# Patient Record
Sex: FEMALE | Race: WHITE | NOT HISPANIC OR LATINO | Employment: OTHER | URBAN - METROPOLITAN AREA
[De-identification: names, ages, dates, MRNs, and addresses within clinical notes are randomized per-mention and may not be internally consistent; named-entity substitution may affect disease eponyms.]

---

## 2023-01-23 ENCOUNTER — TELEPHONE (OUTPATIENT)
Dept: PRIMARY CARE CLINIC | Facility: CLINIC | Age: 73
End: 2023-01-23
Payer: MEDICARE

## 2023-01-23 DIAGNOSIS — R00.2 PALPITATIONS: Primary | ICD-10-CM
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
## 2023-02-23 ENCOUNTER — HOSPITAL ENCOUNTER (OUTPATIENT)
Dept: CARDIOLOGY | Facility: HOSPITAL | Age: 73
Discharge: HOME | End: 2023-02-23
Attending: INTERNAL MEDICINE
Payer: MEDICARE

## 2023-02-23 ENCOUNTER — OFFICE VISIT (OUTPATIENT)
Dept: CARDIOLOGY | Facility: CLINIC | Age: 73
End: 2023-02-23
Payer: MEDICARE

## 2023-02-23 VITALS
OXYGEN SATURATION: 97 % | SYSTOLIC BLOOD PRESSURE: 155 MMHG | WEIGHT: 124.56 LBS | DIASTOLIC BLOOD PRESSURE: 89 MMHG | BODY MASS INDEX: 20.75 KG/M2 | HEART RATE: 112 BPM | HEIGHT: 65 IN

## 2023-02-23 DIAGNOSIS — F41.9 ANXIETY: ICD-10-CM

## 2023-02-23 DIAGNOSIS — R55 SYNCOPE AND COLLAPSE: Primary | ICD-10-CM

## 2023-02-23 DIAGNOSIS — I49.9 CARDIAC ARRHYTHMIA, UNSPECIFIED CARDIAC ARRHYTHMIA TYPE: ICD-10-CM

## 2023-02-23 DIAGNOSIS — M06.9 RHEUMATOID ARTHRITIS, INVOLVING UNSPECIFIED SITE, UNSPECIFIED WHETHER RHEUMATOID FACTOR PRESENT: ICD-10-CM

## 2023-02-23 DIAGNOSIS — I49.9 CARDIAC ARRHYTHMIA, UNSPECIFIED CARDIAC ARRHYTHMIA TYPE: Primary | ICD-10-CM

## 2023-02-23 DIAGNOSIS — R00.2 PALPITATIONS: ICD-10-CM

## 2023-02-23 PROCEDURE — 3288F FALL RISK ASSESSMENT DOCD: CPT | Mod: CPTII,S$GLB,, | Performed by: INTERNAL MEDICINE

## 2023-02-23 PROCEDURE — 1159F MED LIST DOCD IN RCRD: CPT | Mod: CPTII,S$GLB,, | Performed by: INTERNAL MEDICINE

## 2023-02-23 PROCEDURE — 93010 EKG 12-LEAD: ICD-10-PCS | Mod: ,,, | Performed by: INTERNAL MEDICINE

## 2023-02-23 PROCEDURE — 3079F DIAST BP 80-89 MM HG: CPT | Mod: CPTII,S$GLB,, | Performed by: INTERNAL MEDICINE

## 2023-02-23 PROCEDURE — 3008F PR BODY MASS INDEX (BMI) DOCUMENTED: ICD-10-PCS | Mod: CPTII,S$GLB,, | Performed by: INTERNAL MEDICINE

## 2023-02-23 PROCEDURE — 1159F PR MEDICATION LIST DOCUMENTED IN MEDICAL RECORD: ICD-10-PCS | Mod: CPTII,S$GLB,, | Performed by: INTERNAL MEDICINE

## 2023-02-23 PROCEDURE — 3077F SYST BP >= 140 MM HG: CPT | Mod: CPTII,S$GLB,, | Performed by: INTERNAL MEDICINE

## 2023-02-23 PROCEDURE — 3288F PR FALLS RISK ASSESSMENT DOCUMENTED: ICD-10-PCS | Mod: CPTII,S$GLB,, | Performed by: INTERNAL MEDICINE

## 2023-02-23 PROCEDURE — 99999 PR PBB SHADOW E&M-EST. PATIENT-LVL IV: ICD-10-PCS | Mod: PBBFAC,,, | Performed by: INTERNAL MEDICINE

## 2023-02-23 PROCEDURE — 1101F PR PT FALLS ASSESS DOC 0-1 FALLS W/OUT INJ PAST YR: ICD-10-PCS | Mod: CPTII,S$GLB,, | Performed by: INTERNAL MEDICINE

## 2023-02-23 PROCEDURE — 99204 OFFICE O/P NEW MOD 45 MIN: CPT | Mod: S$GLB,,, | Performed by: INTERNAL MEDICINE

## 2023-02-23 PROCEDURE — 99204 PR OFFICE/OUTPT VISIT, NEW, LEVL IV, 45-59 MIN: ICD-10-PCS | Mod: S$GLB,,, | Performed by: INTERNAL MEDICINE

## 2023-02-23 PROCEDURE — 93010 ELECTROCARDIOGRAM REPORT: CPT | Mod: ,,, | Performed by: INTERNAL MEDICINE

## 2023-02-23 PROCEDURE — 99999 PR PBB SHADOW E&M-EST. PATIENT-LVL IV: CPT | Mod: PBBFAC,,, | Performed by: INTERNAL MEDICINE

## 2023-02-23 PROCEDURE — 93005 ELECTROCARDIOGRAM TRACING: CPT

## 2023-02-23 PROCEDURE — 1126F AMNT PAIN NOTED NONE PRSNT: CPT | Mod: CPTII,S$GLB,, | Performed by: INTERNAL MEDICINE

## 2023-02-23 PROCEDURE — 1101F PT FALLS ASSESS-DOCD LE1/YR: CPT | Mod: CPTII,S$GLB,, | Performed by: INTERNAL MEDICINE

## 2023-02-23 PROCEDURE — 3079F PR MOST RECENT DIASTOLIC BLOOD PRESSURE 80-89 MM HG: ICD-10-PCS | Mod: CPTII,S$GLB,, | Performed by: INTERNAL MEDICINE

## 2023-02-23 PROCEDURE — 3077F PR MOST RECENT SYSTOLIC BLOOD PRESSURE >= 140 MM HG: ICD-10-PCS | Mod: CPTII,S$GLB,, | Performed by: INTERNAL MEDICINE

## 2023-02-23 PROCEDURE — 3008F BODY MASS INDEX DOCD: CPT | Mod: CPTII,S$GLB,, | Performed by: INTERNAL MEDICINE

## 2023-02-23 PROCEDURE — 1126F PR PAIN SEVERITY QUANTIFIED, NO PAIN PRESENT: ICD-10-PCS | Mod: CPTII,S$GLB,, | Performed by: INTERNAL MEDICINE

## 2023-02-23 RX ORDER — TRAMADOL HYDROCHLORIDE 50 MG/1
50 TABLET ORAL 2 TIMES DAILY PRN
Status: ON HOLD | COMMUNITY
Start: 2022-11-21 | End: 2023-06-26

## 2023-02-23 RX ORDER — HYDROXYCHLOROQUINE SULFATE 200 MG/1
1 TABLET, FILM COATED ORAL EVERY MORNING
Status: ON HOLD | COMMUNITY
Start: 2023-02-20 | End: 2023-06-26 | Stop reason: HOSPADM
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
## 2023-03-10 ENCOUNTER — HOSPITAL ENCOUNTER (OUTPATIENT)
Dept: CARDIOLOGY | Facility: HOSPITAL | Age: 73
Discharge: HOME | End: 2023-03-10
Attending: INTERNAL MEDICINE
Payer: MEDICARE

## 2023-03-10 DIAGNOSIS — R55 SYNCOPE AND COLLAPSE: ICD-10-CM

## 2023-03-10 DIAGNOSIS — R00.2 PALPITATIONS: ICD-10-CM

## 2023-03-10 LAB
LEFT ARM DIASTOLIC BLOOD PRESSURE: 95 MMHG
LEFT ARM SYSTOLIC BLOOD PRESSURE: 153 MMHG
LEFT CBA DIAS: 16 CM/S
LEFT CBA SYS: 63 CM/S
LEFT CCA DIST DIAS: 24 CM/S
LEFT CCA DIST SYS: 78 CM/S
LEFT CCA MID DIAS: 13 CM/S
LEFT CCA MID SYS: 98 CM/S
LEFT CCA PROX DIAS: 10 CM/S
LEFT CCA PROX SYS: 97 CM/S
LEFT ECA DIAS: 7 CM/S
LEFT ECA SYS: 253 CM/S
LEFT ICA DIST DIAS: 22 CM/S
LEFT ICA DIST SYS: 69 CM/S
LEFT ICA MID DIAS: 31 CM/S
LEFT ICA MID SYS: 112 CM/S
LEFT ICA PROX DIAS: 30 CM/S
LEFT ICA PROX SYS: 103 CM/S
LEFT VERTEBRAL DIAS: 15 CM/S
LEFT VERTEBRAL SYS: 79 CM/S
OHS CV CAROTID RIGHT ICA EDV HIGHEST: 27
OHS CV CAROTID ULTRASOUND LEFT ICA/CCA RATIO: 1.44
OHS CV CAROTID ULTRASOUND RIGHT ICA/CCA RATIO: 1.67
OHS CV PV CAROTID LEFT HIGHEST CCA: 98
OHS CV PV CAROTID LEFT HIGHEST ICA: 112
OHS CV PV CAROTID RIGHT HIGHEST CCA: 83
OHS CV PV CAROTID RIGHT HIGHEST ICA: 97
OHS CV US CAROTID LEFT HIGHEST EDV: 31
RIGHT ARM DIASTOLIC BLOOD PRESSURE: 81 MMHG
RIGHT ARM SYSTOLIC BLOOD PRESSURE: 153 MMHG
RIGHT CBA DIAS: 14 CM/S
RIGHT CBA SYS: 41 CM/S
RIGHT CCA DIST DIAS: 19 CM/S
RIGHT CCA DIST SYS: 58 CM/S
RIGHT CCA MID DIAS: 10 CM/S
RIGHT CCA MID SYS: 83 CM/S
RIGHT CCA PROX DIAS: 13 CM/S
RIGHT CCA PROX SYS: 51 CM/S
RIGHT ECA DIAS: 10 CM/S
RIGHT ECA SYS: 63 CM/S
RIGHT ICA DIST DIAS: 1 CM/S
RIGHT ICA DIST SYS: 79 CM/S
RIGHT ICA MID DIAS: 27 CM/S
RIGHT ICA MID SYS: 97 CM/S
RIGHT ICA PROX DIAS: 16 CM/S
RIGHT ICA PROX SYS: 67 CM/S
RIGHT VERTEBRAL DIAS: 13 CM/S
RIGHT VERTEBRAL SYS: 72 CM/S

## 2023-03-10 PROCEDURE — 93248 EXT ECG>7D<15D REV&INTERPJ: CPT | Mod: ,,, | Performed by: INTERNAL MEDICINE

## 2023-03-10 PROCEDURE — 93880 EXTRACRANIAL BILAT STUDY: CPT | Mod: 26,,, | Performed by: INTERNAL MEDICINE

## 2023-03-10 PROCEDURE — 93248 CV CARDIAC MONITOR - 3-15 DAY ADULT (CUPID ONLY): ICD-10-PCS | Mod: ,,, | Performed by: INTERNAL MEDICINE

## 2023-03-10 PROCEDURE — 93880 EXTRACRANIAL BILAT STUDY: CPT

## 2023-03-10 PROCEDURE — 93880 CV US DOPPLER CAROTID (CUPID ONLY): ICD-10-PCS | Mod: 26,,, | Performed by: INTERNAL MEDICINE

## 2023-03-16 ENCOUNTER — PATIENT MESSAGE (OUTPATIENT)
Dept: PRIMARY CARE CLINIC | Facility: CLINIC | Age: 73
End: 2023-03-16
Payer: MEDICARE

## 2023-03-22 ENCOUNTER — TELEPHONE (OUTPATIENT)
Dept: CARDIOLOGY | Facility: CLINIC | Age: 73
End: 2023-03-22
Payer: MEDICARE

## 2023-03-22 DIAGNOSIS — I47.10 SVT (SUPRAVENTRICULAR TACHYCARDIA): Primary | ICD-10-CM

## 2023-03-22 NOTE — TELEPHONE ENCOUNTER
Pt feels fine- no different than usual  Please advise    ----- Message from Alexi Adhikari sent at 3/22/2023  2:13 PM CDT -----  Regarding: Brigitte with [employer] [facility]  Pt report is for SVT per Brigitte with [employer] [facility].

## 2023-03-22 NOTE — TELEPHONE ENCOUNTER
[facility] sending over report of tachycardia episode pt experience and pt complaining of being dizzy and lightheaded. Will f/u with pt in regards to symptoms

## 2023-03-23 ENCOUNTER — TELEPHONE (OUTPATIENT)
Dept: CARDIOLOGY | Facility: CLINIC | Age: 73
End: 2023-03-23
Payer: MEDICARE

## 2023-03-23 DIAGNOSIS — I47.10 SVT (SUPRAVENTRICULAR TACHYCARDIA): Primary | ICD-10-CM

## 2023-03-23 RX ORDER — METOPROLOL SUCCINATE 25 MG/1
25 TABLET, EXTENDED RELEASE ORAL DAILY
Qty: 30 TABLET | Refills: 11 | Status: SHIPPED | OUTPATIENT
Start: 2023-03-23 | End: 2023-09-12

## 2023-03-23 NOTE — PROGRESS NOTES
Called again, no option to leave a voicemail, episodes of SVT on the monitor   Start toprol and follow up with EP   If symptoms fail to improve go to the ED

## 2023-03-23 NOTE — TELEPHONE ENCOUNTER
Pt was contacted about results:     Called again, no option to leave a voicemail, episodes of SVT on the monitor    Start toprol and follow up with EP    If symptoms fail to improve go to the ED           Pt verbalized understanding with no questions or concerns.

## 2023-03-23 NOTE — TELEPHONE ENCOUNTER
Patient contacted and was advised that the patient was checked on . The report came in for a SVT event on 03/22/2023 with 170-200 beats per minute.at 21:59 pm the vital was place on 03/10/2023.     The patient stated she feels fine and was aware of the event but has no symptoms.   The patient was advised that we would get in contact with her if another appointment was available.   The patient stated understanding with no questions.

## 2023-03-23 NOTE — TELEPHONE ENCOUNTER
Called patient to notify her of her referral to see Dr. Diaz to follow up with her episode of SVT. Made an appointment to follow up with him. Patient understood and accepted appointment with no questions or concerns.

## 2023-04-04 LAB
OHS CV HOLTER SINUS AVERAGE HR: 96
OHS CV HOLTER SINUS MAX HR: 141
OHS CV HOLTER SINUS MIN HR: 52

## 2023-04-10 ENCOUNTER — TELEPHONE (OUTPATIENT)
Dept: CARDIOLOGY | Facility: CLINIC | Age: 73
End: 2023-04-10
Payer: MEDICARE

## 2023-04-10 NOTE — TELEPHONE ENCOUNTER
Called patient to reschedule her upcoming appointment. Patient rescheduled appointment with no questions or concerns.       ----- Message from Yennifer Kramer MA sent at 4/10/2023  3:38 PM CDT -----  Contact: patient  This pt is returning a call.    Yennifer      ----- Message -----  From: Flor Gallardo  Sent: 4/10/2023   1:50 PM CDT  To: Joe Teran Staff    Type:  Patient Call          Who Called: patient         Does the patient know what this is regarding?: Requesting a call back to have appt rescheduled ; please advise           Would the patient rather a call back or a response via [facility]? Call           Best Call Back Number:[phone]             Additional Information:

## 2023-04-18 ENCOUNTER — TELEPHONE (OUTPATIENT)
Dept: PRIMARY CARE CLINIC | Facility: CLINIC | Age: 73
End: 2023-04-18
Payer: MEDICARE

## 2023-04-18 NOTE — TELEPHONE ENCOUNTER
----- Message from Estella Becker sent at 4/17/2023 12:59 PM CDT -----  Contact: charmaine  Patient is calling to speak with the nurse regarding questions and concerns. Reports her rheumatology dr wants her to get an earlier appointment to get her thyroid checked. Please give the patient a call back at .[phone]   Thanks natali

## 2023-05-03 ENCOUNTER — OFFICE VISIT (OUTPATIENT)
Dept: CARDIOLOGY | Facility: CLINIC | Age: 73
End: 2023-05-03
Payer: MEDICARE

## 2023-05-03 VITALS
OXYGEN SATURATION: 98 % | BODY MASS INDEX: 22.89 KG/M2 | HEART RATE: 57 BPM | WEIGHT: 137.56 LBS | SYSTOLIC BLOOD PRESSURE: 120 MMHG | DIASTOLIC BLOOD PRESSURE: 79 MMHG

## 2023-05-03 DIAGNOSIS — I47.10 SUPRAVENTRICULAR TACHYCARDIA: Primary | ICD-10-CM

## 2023-05-03 DIAGNOSIS — I49.3 PVCS (PREMATURE VENTRICULAR CONTRACTIONS): ICD-10-CM

## 2023-05-03 PROCEDURE — 3288F FALL RISK ASSESSMENT DOCD: CPT | Mod: CPTII,S$GLB,, | Performed by: INTERNAL MEDICINE

## 2023-05-03 PROCEDURE — 99999 PR PBB SHADOW E&M-EST. PATIENT-LVL IV: CPT | Mod: PBBFAC,,, | Performed by: INTERNAL MEDICINE

## 2023-05-03 PROCEDURE — 3074F PR MOST RECENT SYSTOLIC BLOOD PRESSURE < 130 MM HG: ICD-10-PCS | Mod: CPTII,S$GLB,, | Performed by: INTERNAL MEDICINE

## 2023-05-03 PROCEDURE — 99999 PR PBB SHADOW E&M-EST. PATIENT-LVL IV: ICD-10-PCS | Mod: PBBFAC,,, | Performed by: INTERNAL MEDICINE

## 2023-05-03 PROCEDURE — 1101F PT FALLS ASSESS-DOCD LE1/YR: CPT | Mod: CPTII,S$GLB,, | Performed by: INTERNAL MEDICINE

## 2023-05-03 PROCEDURE — 1126F PR PAIN SEVERITY QUANTIFIED, NO PAIN PRESENT: ICD-10-PCS | Mod: CPTII,S$GLB,, | Performed by: INTERNAL MEDICINE

## 2023-05-03 PROCEDURE — 99205 OFFICE O/P NEW HI 60 MIN: CPT | Mod: S$GLB,,, | Performed by: INTERNAL MEDICINE

## 2023-05-03 PROCEDURE — 1159F MED LIST DOCD IN RCRD: CPT | Mod: CPTII,S$GLB,, | Performed by: INTERNAL MEDICINE

## 2023-05-03 PROCEDURE — 3288F PR FALLS RISK ASSESSMENT DOCUMENTED: ICD-10-PCS | Mod: CPTII,S$GLB,, | Performed by: INTERNAL MEDICINE

## 2023-05-03 PROCEDURE — 1159F PR MEDICATION LIST DOCUMENTED IN MEDICAL RECORD: ICD-10-PCS | Mod: CPTII,S$GLB,, | Performed by: INTERNAL MEDICINE

## 2023-05-03 PROCEDURE — 1101F PR PT FALLS ASSESS DOC 0-1 FALLS W/OUT INJ PAST YR: ICD-10-PCS | Mod: CPTII,S$GLB,, | Performed by: INTERNAL MEDICINE

## 2023-05-03 PROCEDURE — 3078F DIAST BP <80 MM HG: CPT | Mod: CPTII,S$GLB,, | Performed by: INTERNAL MEDICINE

## 2023-05-03 PROCEDURE — 3008F BODY MASS INDEX DOCD: CPT | Mod: CPTII,S$GLB,, | Performed by: INTERNAL MEDICINE

## 2023-05-03 PROCEDURE — 3074F SYST BP LT 130 MM HG: CPT | Mod: CPTII,S$GLB,, | Performed by: INTERNAL MEDICINE

## 2023-05-03 PROCEDURE — 99205 PR OFFICE/OUTPT VISIT, NEW, LEVL V, 60-74 MIN: ICD-10-PCS | Mod: S$GLB,,, | Performed by: INTERNAL MEDICINE

## 2023-05-03 PROCEDURE — 3078F PR MOST RECENT DIASTOLIC BLOOD PRESSURE < 80 MM HG: ICD-10-PCS | Mod: CPTII,S$GLB,, | Performed by: INTERNAL MEDICINE

## 2023-05-03 PROCEDURE — 3008F PR BODY MASS INDEX (BMI) DOCUMENTED: ICD-10-PCS | Mod: CPTII,S$GLB,, | Performed by: INTERNAL MEDICINE

## 2023-05-03 PROCEDURE — 1126F AMNT PAIN NOTED NONE PRSNT: CPT | Mod: CPTII,S$GLB,, | Performed by: INTERNAL MEDICINE

## 2023-05-03 RX ORDER — GABAPENTIN 300 MG/1
300 CAPSULE ORAL DAILY
COMMUNITY
End: 2024-02-06

## 2023-05-03 RX ORDER — PREDNISONE 5 MG/1
5 TABLET ORAL DAILY
COMMUNITY
End: 2023-07-03

## 2023-05-03 RX ORDER — FOLIC ACID 1 MG/1
1 TABLET ORAL DAILY
Status: ON HOLD | COMMUNITY
End: 2024-01-26

## 2023-05-03 RX ORDER — METHOTREXATE 2.5 MG/1
TABLET ORAL
Status: ON HOLD | COMMUNITY
End: 2024-01-26 | Stop reason: ALTCHOICE

## 2023-05-03 NOTE — PROGRESS NOTES
Subjective:   Patient ID:  Sheryl Carter is a 72 y.o. female who presents for evaluation of SVT      72 yoF here for SVT management. She had an episode of syncope which resulted in a cardiac work up. Event monitor revealed sustained short RP tachycardia 175-200 bpm lasting over 2h. She also had very high PVC burden 28%. She feels light headed with the SVT events and subsequently feels drained after the events are over.     Stress echo 8/21:  Stress Echo: All segments became appropriately hypercontractile with   physiologic stress. LVEF 65%. No ischemic changes visualized.        Event monitor 4/23:         Study duration: 10 days         Baseline rhythm is NSR            Average diurnal HR is @ 100            Average nocturnal HR is @ 90         SDNN is 96 - c/w moderate vagal tone for age         AF was not detected          There were multiple episodes of what appeared to be AV neymar reentry tachycardia with rates varying between a low of 175 and a high of 220 beats per minute.  The longest such episode lasted 2 hours and 22 minutes.  At least 2 episodes were symptomatic as the patient enter the event correlating with these.         No episodes of sustained VT were noted but the PVC burden was 28%.         Two symptomatic events submitted.  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    Past Medical History:  No date: Arthritis  No date: HTN (hypertension)  No date: Insulin resistance    Past Surgical History:  No date: LEFT HIP REPLACEMENT  No date: TOTAL ABDOMINAL HYSTERECTOMY W/ BILATERAL SALPINGOOPHORECTOMY    Social History    Socioeconomic History      Marital status:     Tobacco Use      Smoking status: Former      Smokeless tobacco: Never    Substance and Sexual Activity      Alcohol use: Yes      Drug use: Never      Sexual activity: Yes        Birth control/protection: Post-menopausal    Review of patient's family history indicates:    Current Outpatient Medications:  buPROPion (WELLBUTRIN SR) 150  MG TBSR 12 hr tablet, Wellbutrin SR Take No date recorded No form recorded No frequency recorded No route recorded No set duration recorded No set duration amount recorded active No dosage strength recorded No dosage strength units of measure recorded, Disp: , Rfl:   estradioL (IMVEXXY MAINTENANCE PACK) 10 mcg Inst, Place 1 applicator vaginally twice a week., Disp: 8 each, Rfl: 11  fluticasone propionate (FLONASE) 50 mcg/actuation nasal spray, fluticasone propionate Take 1-2 spray(s) (nasal) 2 times per day PRN for 7 days [id number] spray,suspension 2 times per day nasal 7 days suspended 50 mcg/actuation, Disp: , Rfl:   hydrocortisone-pramoxine (ANALPRAM-HC) 2.5-1 % Crea, , Disp: , Rfl:   hydrOXYchloroQUINE (PLAQUENIL) 200 mg tablet, Take 1 tablet by mouth every morning., Disp: , Rfl:   metFORMIN (GLUCOPHAGE) 500 MG tablet, metformin Take No date recorded No form recorded No frequency recorded No route recorded No set duration recorded No set duration amount recorded active No dosage strength recorded No dosage strength units of measure recorded, Disp: , Rfl:   metoprolol succinate (TOPROL-XL) 25 MG 24 hr tablet, Take 1 tablet (25 mg total) by mouth once daily., Disp: 30 tablet, Rfl: 11  pantoprazole (PROTONIX) 40 MG tablet, Take 40 mg by mouth., Disp: , Rfl:   thyroid, pork, (ARMOUR THYROID) 60 mg Tab, Take 60 mg by mouth., Disp: , Rfl:   traMADoL (ULTRAM) 50 mg tablet, Take 50 mg by mouth 2 (two) times daily as needed., Disp: , Rfl:   vitamin D (VITAMIN D3) 1000 units Tab, Take 1,000 Units by mouth once daily., Disp: , Rfl:   XIIDRA 5 % Dpet, , Disp: , Rfl:     No current facility-administered medications for this visit.      Review of Systems   Constitutional: Negative for fever and malaise/fatigue.   HENT:  Negative for sore throat.    Eyes:  Negative for blurred vision.   Cardiovascular:  Positive for chest pain. Negative for claudication, cyanosis, dyspnea on exertion, irregular heartbeat, leg swelling,  near-syncope, orthopnea, palpitations, paroxysmal nocturnal dyspnea and syncope.   Respiratory:  Negative for cough and hemoptysis.    Hematologic/Lymphatic: Negative for bleeding problem.   Skin:  Negative for rash.   Musculoskeletal:  Negative for falls.   Gastrointestinal:  Negative for abdominal pain.   Genitourinary: Negative.    Neurological: Negative.    Psychiatric/Behavioral:  Negative for altered mental status and substance abuse.      Objective:   Physical Exam  Vitals reviewed.   Constitutional:       General: She is not in acute distress.     Appearance: She is well-developed.   HENT:      Head: Normocephalic and atraumatic.   Eyes:      Pupils: Pupils are equal, round, and reactive to light.   Neck:      Thyroid: No thyromegaly.      Vascular: No JVD.   Cardiovascular:      Rate and Rhythm: Normal rate and regular rhythm.      Chest Wall: PMI is not displaced.      Heart sounds: Normal heart sounds, S1 normal and S2 normal. No murmur heard.    No friction rub. No gallop.   Pulmonary:      Effort: Pulmonary effort is normal. No respiratory distress.      Breath sounds: Normal breath sounds. No wheezing or rales.   Abdominal:      General: Bowel sounds are normal. There is no distension.      Palpations: Abdomen is soft.      Tenderness: There is no abdominal tenderness. There is no guarding or rebound.   Musculoskeletal:         General: No tenderness. Normal range of motion.      Cervical back: Normal range of motion.   Skin:     General: Skin is warm and dry.      Findings: No erythema or rash.   Neurological:      Mental Status: She is alert and oriented to person, place, and time.      Cranial Nerves: No cranial nerve deficit.   Psychiatric:         Behavior: Behavior normal.         Thought Content: Thought content normal.         Judgment: Judgment normal.       Assessment:      1. Supraventricular tachycardia    2. PVCs (premature ventricular contractions)        Plan:     72 yoF here for SVT  management. She has documented, symptomatic, short RP tachycardia over 200 bpm. I recommended EPS/RFA for definitive therapy. She also has outflow tract PVCs at 28% burden. I offered concomitant PVC ablation. Extensive discussion regarding risks, benefits, and alternative approaches to the procedure was had with the patient.  The patient voices understanding and all questions have been answered.  The patient would like to proceed as planned.    EPS/RFA for SVT  Prepare to map LVOT and RVOT for PVCs  [facility]

## 2023-05-09 ENCOUNTER — TELEPHONE (OUTPATIENT)
Dept: ELECTROPHYSIOLOGY | Facility: CLINIC | Age: 73
End: 2023-05-09
Payer: MEDICARE

## 2023-05-09 DIAGNOSIS — I49.3 PVCS (PREMATURE VENTRICULAR CONTRACTIONS): ICD-10-CM

## 2023-05-09 DIAGNOSIS — I49.9 CARDIAC ARRHYTHMIA, UNSPECIFIED CARDIAC ARRHYTHMIA TYPE: ICD-10-CM

## 2023-05-09 DIAGNOSIS — I47.10 SUPRAVENTRICULAR TACHYCARDIA: Primary | ICD-10-CM

## 2023-05-09 NOTE — TELEPHONE ENCOUNTER
Spoke with Ms. Carter and scheduled her procedure for 6/26/23. Procedure details reviewed and instructions will be sent via mail as requested.    
General: NAD, good hygiene, well developed  HENT: Atraumatic, normal fontanella, EOMI, no conjunctivae injection, moist mucosa, no post. oropharynx erythema, exudates, TM intact no effusion.   Neck: normal ROM and trachea midline   Cardiovascular: tachy, S1&2, no M or R  Respiratory: CTABL, no wheezes or crackles, no decreased breath sounds  Abdominal: soft and non-tender non distended, neg for guarding, no CVA tenderness   Extremities: no edema of the legs/feet  Skin: warm, well perfused, cap refill< 3 sec  Neurologic: nonfocal

## 2023-05-15 ENCOUNTER — TELEPHONE (OUTPATIENT)
Dept: PRIMARY CARE CLINIC | Facility: CLINIC | Age: 73
End: 2023-05-15
Payer: MEDICARE

## 2023-05-15 NOTE — TELEPHONE ENCOUNTER
----- Message from Neha Louis sent at 5/15/2023  2:38 PM CDT -----    Patient Returning Call        Who Called:pt  Does the patient know what this is regarding?:need nurse or doctor to call asap  medications are not working together causing stomach problems  Would the patient rather a call back or a response via [facility]? call  Best Call Back Number:[phone]  Additional Information: call back

## 2023-05-19 ENCOUNTER — PATIENT MESSAGE (OUTPATIENT)
Dept: PRIMARY CARE CLINIC | Facility: CLINIC | Age: 73
End: 2023-05-19
Payer: MEDICARE

## 2023-06-19 ENCOUNTER — LAB VISIT (OUTPATIENT)
Dept: LAB | Facility: HOSPITAL | Age: 73
End: 2023-06-19
Attending: INTERNAL MEDICINE
Payer: MEDICARE

## 2023-06-19 DIAGNOSIS — I49.9 CARDIAC ARRHYTHMIA, UNSPECIFIED CARDIAC ARRHYTHMIA TYPE: ICD-10-CM

## 2023-06-19 DIAGNOSIS — I49.3 PVCS (PREMATURE VENTRICULAR CONTRACTIONS): ICD-10-CM

## 2023-06-19 DIAGNOSIS — I47.10 SUPRAVENTRICULAR TACHYCARDIA: ICD-10-CM

## 2023-06-19 LAB
ANION GAP SERPL CALC-SCNC: 10 MMOL/L (ref 8–16)
APTT PPP: 26.1 SEC (ref 21–32)
BUN SERPL-MCNC: 12 MG/DL (ref 8–23)
CALCIUM SERPL-MCNC: 10.2 MG/DL (ref 8.7–10.5)
CHLORIDE SERPL-SCNC: 107 MMOL/L (ref 95–110)
CO2 SERPL-SCNC: 24 MMOL/L (ref 23–29)
CREAT SERPL-MCNC: 1 MG/DL (ref 0.5–1.4)
ERYTHROCYTE [DISTWIDTH] IN BLOOD BY AUTOMATED COUNT: 20.4 % (ref 11.5–14.5)
EST. GFR  (NO RACE VARIABLE): >60 ML/MIN/1.73 M^2
GLUCOSE SERPL-MCNC: 83 MG/DL (ref 70–110)
HCT VFR BLD AUTO: 41.3 % (ref 37–48.5)
HGB BLD-MCNC: 12.7 G/DL (ref 12–16)
INR PPP: 1 (ref 0.8–1.2)
MCH RBC QN AUTO: 26.8 PG (ref 27–31)
MCHC RBC AUTO-ENTMCNC: 30.8 G/DL (ref 32–36)
MCV RBC AUTO: 87 FL (ref 82–98)
PLATELET # BLD AUTO: 418 K/UL (ref 150–450)
PMV BLD AUTO: 10.6 FL (ref 9.2–12.9)
POTASSIUM SERPL-SCNC: 5 MMOL/L (ref 3.5–5.1)
PROTHROMBIN TIME: 10.5 SEC (ref 9–12.5)
RBC # BLD AUTO: 4.73 M/UL (ref 4–5.4)
SODIUM SERPL-SCNC: 141 MMOL/L (ref 136–145)
WBC # BLD AUTO: 6.69 K/UL (ref 3.9–12.7)

## 2023-06-19 PROCEDURE — 85610 PROTHROMBIN TIME: CPT | Performed by: INTERNAL MEDICINE

## 2023-06-19 PROCEDURE — 80048 BASIC METABOLIC PNL TOTAL CA: CPT | Performed by: INTERNAL MEDICINE

## 2023-06-19 PROCEDURE — 85730 THROMBOPLASTIN TIME PARTIAL: CPT | Performed by: INTERNAL MEDICINE

## 2023-06-19 PROCEDURE — 36415 COLL VENOUS BLD VENIPUNCTURE: CPT | Performed by: INTERNAL MEDICINE

## 2023-06-19 PROCEDURE — 85027 COMPLETE CBC AUTOMATED: CPT | Performed by: INTERNAL MEDICINE

## 2023-06-20 ENCOUNTER — TELEPHONE (OUTPATIENT)
Dept: ELECTROPHYSIOLOGY | Facility: CLINIC | Age: 73
End: 2023-06-20
Payer: MEDICARE

## 2023-06-20 NOTE — TELEPHONE ENCOUNTER
----- Message from Yennifer Kramer MA sent at 6/19/2023  3:46 PM CDT -----  Regarding: FW: please call    ----- Message -----  From: Elda Yoo  Sent: 6/19/2023   3:00 PM CDT  To: Joe Teran Staff  Subject: please call                                      The pt is calling about her procedure Please call her back @ [phone]. Thanks, Elda

## 2023-06-20 NOTE — TELEPHONE ENCOUNTER
I spoke with Ms. Carter and she let me know that she has a skin tear on her calf that was bothering her and she went to urgent care and they put her on abx for 7 days. Her last dose will be on the day of her procedure, 6/26. I let her know that I would discuss this with Dr. Diaz and get back with her. She verbalized understanding and appreciated the call.

## 2023-06-20 NOTE — TELEPHONE ENCOUNTER
----- Message from Yennifer Kramer MA sent at 6/20/2023 11:19 AM CDT -----  Regarding: FW: please call    ----- Message -----  From: Elda Yoo  Sent: 6/20/2023  10:38 AM CDT  To: Joe Teran Staff  Subject: please call                                      The pt is calling to speak with Tata. Please call her back @ [phone]. Thanks, Elda

## 2023-06-20 NOTE — TELEPHONE ENCOUNTER
I attempted to contact Ms. Gibbson. No answer. Unable to leave a message as patient does not have voicemail.

## 2023-06-22 ENCOUNTER — TELEPHONE (OUTPATIENT)
Dept: ELECTROPHYSIOLOGY | Facility: CLINIC | Age: 73
End: 2023-06-22
Payer: MEDICARE

## 2023-06-22 NOTE — TELEPHONE ENCOUNTER
I spoke with Ms. Carter and let her know that we are good to go for her procedure on Monday. I also reviewed and confirmed procedure details with her. She appreciated the call.

## 2023-06-22 NOTE — TELEPHONE ENCOUNTER
----- Message from Jeffry Diaz MD sent at 6/20/2023  5:38 PM CDT -----  Regarding: RE: please call  Good to go thanks  ----- Message -----  From: Tata Roman RN  Sent: 6/20/2023   2:14 PM CDT  To: Jeffry Diaz MD  Subject: FW: please call                                  I spoke with her today and she let me know that she has a skin tear on her calf that was bothering her and she went to urgent care and they put her on abx for 7 days. Her last dose will be on the day of her procedure, 6/26. She is scheduled for a PVC/SVT RFA. Do we need to make any changes or ok to proceed as scheduled?    Thanks,   Tata  ----- Message -----  From: Yennifer Kramer MA  Sent: 6/20/2023  11:19 AM CDT  To: Tata Roman RN  Subject: FW: please call                                    ----- Message -----  From: Elda Yoo  Sent: 6/20/2023  10:38 AM CDT  To: Joe Teran Staff  Subject: please call                                      The pt is calling to speak with Tata. Please call her back @ [phone]. Thanks, Elda

## 2023-06-25 DIAGNOSIS — N63.11 MASS OF UPPER OUTER QUADRANT OF RIGHT BREAST: Primary | ICD-10-CM

## 2023-06-25 PROBLEM — N60.11 DIFFUSE CYSTIC MASTOPATHY OF RIGHT BREAST: Status: ACTIVE | Noted: 2023-06-25

## 2023-06-25 PROBLEM — N63.20 MASS OF MULTIPLE SITES OF LEFT BREAST: Status: ACTIVE | Noted: 2023-06-25

## 2023-06-25 PROBLEM — N60.12 DIFFUSE CYSTIC MASTOPATHY OF LEFT BREAST: Status: ACTIVE | Noted: 2023-06-25

## 2023-06-25 NOTE — PROGRESS NOTES
[facility]  MD Bety Lewis, NP-C    Chief Complaint:   Sheryl Carter is a 72 y.o. female presenting today for  annual follow up. She is due for Mammogram  She reports no interval changes on her self-breast examination.     History of Present Illness:   Mrs. Carter first presented on September 4, 2019 to establish ongoing breast care. She has a history of bilateral breast biopsies. The most recent left breast core biopsies were in June 2019 and were benign showing a benign fibroadenoma and papillomatosis. In December 2019 she was noted with a suspicious right breast mass and snonocore biopsy revealed a hyalinized fibroadenoma, occasional calcifications with NEM. MD:::Ashley Ayon MD.    Past Medical History:   Diagnosis Date    Arthritis     Diffuse cystic mastopathy of left breast 06/25/2023    Diffuse cystic mastopathy of right breast 06/25/2023    HTN (hypertension)     Insulin resistance     Mass of multiple sites of left breast 06/25/2023    Mass of upper outer quadrant of right breast 06/25/2023    SVT (supraventricular tachycardia)       Past Surgical History:   Procedure Laterality Date    ABLATION N/A 6/26/2023    Procedure: Ablation;  Surgeon: Jeffry Diaz MD;  Location: [facility];  Service: Cardiology;  Laterality: N/A;  PVC/SVT, RFA, ACOSTA, anes, MB, 3 Prep    ABLATION, SVT, SLOW PATHWAY MODIFICATION FOR AVNRT  6/26/2023    Procedure: Ablation, SVT, Slow Pathway Modification For AVNRT;  Surgeon: Jeffry Diaz MD;  Location: [facility];  Service: Cardiology;;    LEFT HIP REPLACEMENT      TOTAL ABDOMINAL HYSTERECTOMY W/ BILATERAL SALPINGOOPHORECTOMY          Current Outpatient Medications:     aspirin (ECOTRIN) 81 MG EC tablet, Take 1 tablet (81 mg total) by mouth once daily., Disp: 30 tablet, Rfl: 11    buPROPion (WELLBUTRIN SR) 100 MG TBSR 12 hr tablet, Wellbutrin SR Take (oral) 2 times per day No date recorded tablet  sustained-release 12 hr 2 times per day oral No set duration recorded No set duration amount recorded active 100 mg, Disp: , Rfl:     estradioL (IMVEXXY MAINTENANCE PACK) 10 mcg Inst, Place 1 applicator vaginally twice a week., Disp: 8 each, Rfl: 11    ESTRADIOL ACETATE VAGL, estradiol acetate Take No date recorded No form recorded No frequency recorded No route recorded No set duration recorded No set duration amount recorded active No dosage strength recorded No dosage strength units of measure recorded, Disp: , Rfl:     famotidine (PEPCID) 20 MG tablet, famotidine Take 1 Tablet (oral) 2 times per day for 7 days 20220912 tablet 2 times per day oral 7 days suspended 20 mg, Disp: , Rfl:     fluticasone propionate (FLONASE) 50 mcg/actuation nasal spray, fluticasone propionate Take 1-2 spray(s) (nasal) 2 times per day PRN for 7 days [id number] spray,suspension 2 times per day nasal 7 days suspended 50 mcg/actuation, Disp: , Rfl:     folic acid (FOLVITE) 1 MG tablet, Take 1 mg by mouth once daily., Disp: , Rfl:     gabapentin (NEURONTIN) 300 MG capsule, Take 300 mg by mouth once daily., Disp: , Rfl:     hydrocortisone-pramoxine (ANALPRAM-HC) 2.5-1 % Crea, , Disp: , Rfl:     metFORMIN (GLUCOPHAGE) 500 MG tablet, metformin Take No date recorded No form recorded No frequency recorded No route recorded No set duration recorded No set duration amount recorded active No dosage strength recorded No dosage strength units of measure recorded, Disp: , Rfl:     methotrexate 2.5 MG Tab, Take by mouth every 7 days., Disp: , Rfl:     metoprolol succinate (TOPROL-XL) 25 MG 24 hr tablet, Take 1 tablet (25 mg total) by mouth once daily., Disp: 30 tablet, Rfl: 11    pantoprazole (PROTONIX) 40 MG tablet, Take 40 mg by mouth., Disp: , Rfl:     thyroid, pork, (ARMOUR THYROID) 60 mg Tab, Take 60 mg by mouth., Disp: , Rfl:     vitamin D (VITAMIN D3) 1000 units Tab, Take 1,000 Units by mouth once daily., Disp: , Rfl:     vitamin D (VITAMIN D3)  1000 units Tab, Take 1 tablet by mouth every morning., Disp: , Rfl:     XIIDRA 5 % Dpet, , Disp: , Rfl:    Review of patient's allergies indicates:   Allergen Reactions    Codeine Nausea Only     Other reaction(s): Hallucinations, anxious    Diphenhydramine hcl Rash     Other reaction(s): Rash, Rash, makes me anxious    Meperidine Nausea Only     Other reaction(s): Vomiting    Codeine phosphate Hallucinations      Social History     Tobacco Use    Smoking status: Former     Types: Cigarettes     Quit date:      Years since quittin.5    Smokeless tobacco: Never   Substance Use Topics    Alcohol use: Not Currently      Family History   Problem Relation Age of Onset    Hypertension Father     Diabetes Paternal Grandmother     Heart disease Paternal Grandmother     Breast cancer Paternal Aunt 73    Ovarian cancer Neg Hx         Review of Systems   Integumentary:  Negative for color change, rash, mole/lesion, breast mass, breast discharge and breast tenderness.   Breast: Negative for mass and tenderness     Physical Exam   HENT:   Head: Normocephalic.   Pulmonary/Chest: Right breast exhibits no inverted nipple, no mass, no nipple discharge, no skin change and no tenderness. Left breast exhibits no inverted nipple, no mass, no nipple discharge, no skin change and no tenderness. No breast swelling.   Genitourinary: No breast swelling.   Musculoskeletal: Lymphadenopathy:      Upper Body:      Right upper body: No supraclavicular or axillary adenopathy.      Left upper body: No supraclavicular or axillary adenopathy.     Neurological: She is alert.      MAMMOGRAM REPORT: The patient has had bilateral breast core biopsies. The breast tissue is heterogeneously dense, which lowers the sensitivity of mammography. There are no suspicious masses or suspicious calcifications seen to suggest malignancy. Scattered, circumscribed, benign-appearing masses are demonstrated. Benign-type calcifications are identified. IMPRESSION:  No evidence of malignancy. No significant change when compared to previous exam. Follow-up mammography is recommended in one year.      NOTE:::We viewed her films together at today's visit.  We discussed the multiple views obtained and the important findings.  Even benign changes were mentioned and her questions were answered.  She knows that she may receive a formal letter or report from the Radiologist.  She is to contact us if she has questions.      Assessment/Plan  1. Mass of upper outer quadrant of right breast  Assessment & Plan:  We reviewed our findings today and her questions were answered.  She understands that her imaging and exams have remained stable (and show nothing concerning).  She is comfortable being followed in a conservative fashion.      She understands the importance of monthly self-breast examination and knows to report any and all changes as they occur.        2. Mass of multiple sites of left breast  Assessment & Plan:  Same as above      3. Diffuse cystic mastopathy of right breast  Assessment & Plan:  We discussed our Fibrocystic Mastopathy Protocol in detail. She should take Vitamin E 800 IU everyday x 3 months or until non-tender then can stop Vitamin E vs. continue daily at 400 IU.  The use of ice packs or warms soaks to tender area of the breast may also be of some benefit.  If warm soaks help her tenderness - She can use Aspercreme (unless allergic to Aspirin) on the affected area.  Ibuprofen (if no contraindications) at 800 mg three times per day for 5 days can also relieve many symptoms associated with swollen or inflamed tissue.  She can repeat Ibuprofen for 5 days, but then should be off for 5 days as it may cause gastric upset.  It is a good idea to wear a tight bra during the day and night to minimize movement of the tender area (Sports Bras work well).  Evening Primrose Oil can be bought over the counter and used at a dose of 3000 mg per day to help with any breast  pain/tenderness not improved by implementing the above measures.        4. Diffuse cystic mastopathy of left breast  Assessment & Plan:  Same as above      5. Mammographic microcalcification  Assessment & Plan:  Her imaging and exams have remained stable      6. Family history of malignant neoplasm of breast  Assessment & Plan:  We discussed her family history and how it could impact her own future risks.  We discussed family vs. genetic history and the importance and implications of each. All questions answered to her satisfaction.  She knows that as additional family members are diagnosed - she will need to let us know as this may change follow up and imaging recommendations.               Medical Decision Making:  It is my impression that this patient suffers all conditions contained in this medical document.  Each of these conditions did affect our plan of care and my medical decision making today.  It is my opinion that the medical decision making concerning this patient was of moderate difficulty based on the aforementioned conditions.  Any further recommendations will be communicated to the patient by me.  I have reviewed and verified her allergies, list of medications, medical and surgical histories, social history, and a pertinent review of symptoms.      Follow up:  1 year and prn    For:  ALBINO GARCIA) at

## 2023-06-26 ENCOUNTER — HOSPITAL ENCOUNTER (OUTPATIENT)
Facility: HOSPITAL | Age: 73
Discharge: HOME | End: 2023-06-26
Attending: INTERNAL MEDICINE | Admitting: INTERNAL MEDICINE
Payer: MEDICARE

## 2023-06-26 ENCOUNTER — ANESTHESIA (OUTPATIENT)
Dept: MEDSURG UNIT | Facility: HOSPITAL | Age: 73
End: 2023-06-26
Payer: MEDICARE

## 2023-06-26 ENCOUNTER — ANESTHESIA EVENT (OUTPATIENT)
Dept: MEDSURG UNIT | Facility: HOSPITAL | Age: 73
End: 2023-06-26
Payer: MEDICARE

## 2023-06-26 VITALS
TEMPERATURE: 98 F | WEIGHT: 144 LBS | SYSTOLIC BLOOD PRESSURE: 153 MMHG | RESPIRATION RATE: 18 BRPM | BODY MASS INDEX: 23.99 KG/M2 | OXYGEN SATURATION: 96 % | HEIGHT: 65 IN | DIASTOLIC BLOOD PRESSURE: 88 MMHG | HEART RATE: 80 BPM

## 2023-06-26 DIAGNOSIS — I49.3 PVCS (PREMATURE VENTRICULAR CONTRACTIONS): Primary | ICD-10-CM

## 2023-06-26 DIAGNOSIS — I49.3 PREMATURE VENTRICULAR CONTRACTIONS (PVCS) (VPCS): ICD-10-CM

## 2023-06-26 DIAGNOSIS — I49.9 ARRHYTHMIA: ICD-10-CM

## 2023-06-26 DIAGNOSIS — I47.10 SUPRAVENTRICULAR TACHYCARDIA: ICD-10-CM

## 2023-06-26 PROCEDURE — 93010 EKG 12-LEAD: ICD-10-PCS | Mod: ,,, | Performed by: INTERNAL MEDICINE

## 2023-06-26 PROCEDURE — 36620 INSERTION CATHETER ARTERY: CPT | Mod: 59,,, | Performed by: ANESTHESIOLOGY

## 2023-06-26 PROCEDURE — 25500020 PHARM REV CODE 255: Performed by: INTERNAL MEDICINE

## 2023-06-26 PROCEDURE — 93653 COMPRE EP EVAL TX SVT: CPT | Mod: ,,, | Performed by: INTERNAL MEDICINE

## 2023-06-26 PROCEDURE — 27201423 OPTIME MED/SURG SUP & DEVICES STERILE SUPPLY: Performed by: INTERNAL MEDICINE

## 2023-06-26 PROCEDURE — 93655 ICAR CATH ABLTJ DSCRT ARRHYT: CPT | Performed by: INTERNAL MEDICINE

## 2023-06-26 PROCEDURE — C1766 INTRO/SHEATH,STRBLE,NON-PEEL: HCPCS | Performed by: INTERNAL MEDICINE

## 2023-06-26 PROCEDURE — 63600175 PHARM REV CODE 636 W HCPCS: Performed by: INTERNAL MEDICINE

## 2023-06-26 PROCEDURE — 37000008 HC ANESTHESIA 1ST 15 MINUTES: Performed by: INTERNAL MEDICINE

## 2023-06-26 PROCEDURE — D9220A PRA ANESTHESIA: Mod: ANES,,, | Performed by: ANESTHESIOLOGY

## 2023-06-26 PROCEDURE — 25000003 PHARM REV CODE 250: Performed by: ANESTHESIOLOGY

## 2023-06-26 PROCEDURE — 25000003 PHARM REV CODE 250: Performed by: NURSE ANESTHETIST, CERTIFIED REGISTERED

## 2023-06-26 PROCEDURE — C1769 GUIDE WIRE: HCPCS | Performed by: INTERNAL MEDICINE

## 2023-06-26 PROCEDURE — C1730 CATH, EP, 19 OR FEW ELECT: HCPCS | Performed by: INTERNAL MEDICINE

## 2023-06-26 PROCEDURE — 63600175 PHARM REV CODE 636 W HCPCS: Performed by: ANESTHESIOLOGY

## 2023-06-26 PROCEDURE — 93005 ELECTROCARDIOGRAM TRACING: CPT | Mod: 59

## 2023-06-26 PROCEDURE — 25000003 PHARM REV CODE 250: Performed by: STUDENT IN AN ORGANIZED HEALTH CARE EDUCATION/TRAINING PROGRAM

## 2023-06-26 PROCEDURE — 93010 ELECTROCARDIOGRAM REPORT: CPT | Mod: ,,, | Performed by: INTERNAL MEDICINE

## 2023-06-26 PROCEDURE — 63600175 PHARM REV CODE 636 W HCPCS: Performed by: NURSE ANESTHETIST, CERTIFIED REGISTERED

## 2023-06-26 PROCEDURE — C1894 INTRO/SHEATH, NON-LASER: HCPCS | Performed by: INTERNAL MEDICINE

## 2023-06-26 PROCEDURE — 37000009 HC ANESTHESIA EA ADD 15 MINS: Performed by: INTERNAL MEDICINE

## 2023-06-26 PROCEDURE — D9220A PRA ANESTHESIA: ICD-10-PCS | Mod: ANES,,, | Performed by: ANESTHESIOLOGY

## 2023-06-26 PROCEDURE — D9220A PRA ANESTHESIA: Mod: CRNA,,, | Performed by: NURSE ANESTHETIST, CERTIFIED REGISTERED

## 2023-06-26 PROCEDURE — C1760 CLOSURE DEV, VASC: HCPCS | Performed by: INTERNAL MEDICINE

## 2023-06-26 PROCEDURE — 25000003 PHARM REV CODE 250: Performed by: INTERNAL MEDICINE

## 2023-06-26 PROCEDURE — 93653 PR ELECTROPHYS EVAL, COMPREHEN, W/SUPRAVENT TACHYCARD TRMT: ICD-10-PCS | Mod: ,,, | Performed by: INTERNAL MEDICINE

## 2023-06-26 PROCEDURE — C2630 CATH, EP, COOL-TIP: HCPCS | Performed by: INTERNAL MEDICINE

## 2023-06-26 PROCEDURE — 27201037 HC PRESSURE MONITORING SET UP

## 2023-06-26 PROCEDURE — 93655 ICAR CATH ABLTJ DSCRT ARRHYT: CPT | Mod: ,,, | Performed by: INTERNAL MEDICINE

## 2023-06-26 PROCEDURE — 93653 COMPRE EP EVAL TX SVT: CPT | Performed by: INTERNAL MEDICINE

## 2023-06-26 PROCEDURE — 93655 PR ABLATE ARRHYTHMIA ADD ON: ICD-10-PCS | Mod: ,,, | Performed by: INTERNAL MEDICINE

## 2023-06-26 PROCEDURE — 36620 ARTERIAL: ICD-10-PCS | Mod: 59,,, | Performed by: ANESTHESIOLOGY

## 2023-06-26 PROCEDURE — D9220A PRA ANESTHESIA: ICD-10-PCS | Mod: CRNA,,, | Performed by: NURSE ANESTHETIST, CERTIFIED REGISTERED

## 2023-06-26 PROCEDURE — C1887 CATHETER, GUIDING: HCPCS | Performed by: INTERNAL MEDICINE

## 2023-06-26 RX ORDER — FLUORESCEIN SODIUM AND BENOXINATE HYDROCHLORIDE 2.6; 4.4 MG/ML; MG/ML
2 SOLUTION/ DROPS OPHTHALMIC ONCE
Status: COMPLETED | OUTPATIENT
Start: 2023-06-26 | End: 2023-06-26

## 2023-06-26 RX ORDER — IODIXANOL 320 MG/ML
INJECTION, SOLUTION INTRAVASCULAR
Status: DISCONTINUED | OUTPATIENT
Start: 2023-06-26 | End: 2023-06-26 | Stop reason: HOSPADM

## 2023-06-26 RX ORDER — HEPARIN SOD,PORCINE/0.9 % NACL 1000/500ML
INTRAVENOUS SOLUTION INTRAVENOUS
Status: DISCONTINUED | OUTPATIENT
Start: 2023-06-26 | End: 2023-06-26 | Stop reason: HOSPADM

## 2023-06-26 RX ORDER — PROPOFOL 10 MG/ML
VIAL (ML) INTRAVENOUS CONTINUOUS PRN
Status: DISCONTINUED | OUTPATIENT
Start: 2023-06-26 | End: 2023-06-26

## 2023-06-26 RX ORDER — CIPROFLOXACIN HYDROCHLORIDE 3 MG/ML
1 SOLUTION/ DROPS OPHTHALMIC 4 TIMES DAILY
Status: DISCONTINUED | OUTPATIENT
Start: 2023-06-26 | End: 2023-06-26 | Stop reason: HOSPADM

## 2023-06-26 RX ORDER — GABAPENTIN 300 MG/1
300 CAPSULE ORAL DAILY
Status: DISCONTINUED | OUTPATIENT
Start: 2023-06-26 | End: 2023-06-26 | Stop reason: HOSPADM

## 2023-06-26 RX ORDER — SODIUM CHLORIDE 0.9 % (FLUSH) 0.9 %
10 SYRINGE (ML) INJECTION
Status: DISCONTINUED | OUTPATIENT
Start: 2023-06-26 | End: 2023-06-26 | Stop reason: HOSPADM

## 2023-06-26 RX ORDER — HEPARIN SODIUM 1000 [USP'U]/ML
INJECTION, SOLUTION INTRAVENOUS; SUBCUTANEOUS
Status: DISCONTINUED | OUTPATIENT
Start: 2023-06-26 | End: 2023-06-26

## 2023-06-26 RX ORDER — DEXMEDETOMIDINE HYDROCHLORIDE 100 UG/ML
INJECTION, SOLUTION INTRAVENOUS
Status: DISCONTINUED | OUTPATIENT
Start: 2023-06-26 | End: 2023-06-26

## 2023-06-26 RX ORDER — LIDOCAINE HYDROCHLORIDE 20 MG/ML
INJECTION, SOLUTION INFILTRATION; PERINEURAL
Status: DISCONTINUED | OUTPATIENT
Start: 2023-06-26 | End: 2023-06-26 | Stop reason: HOSPADM

## 2023-06-26 RX ORDER — ASPIRIN 81 MG/1
81 TABLET ORAL DAILY
Qty: 30 TABLET | Refills: 11 | Status: SHIPPED | OUTPATIENT
Start: 2023-06-26 | End: 2024-06-25

## 2023-06-26 RX ORDER — ACETAMINOPHEN 325 MG/1
650 TABLET ORAL EVERY 4 HOURS PRN
Status: DISCONTINUED | OUTPATIENT
Start: 2023-06-26 | End: 2023-06-26 | Stop reason: HOSPADM

## 2023-06-26 RX ORDER — ONDANSETRON 2 MG/ML
4 INJECTION INTRAMUSCULAR; INTRAVENOUS ONCE
Status: COMPLETED | OUTPATIENT
Start: 2023-06-26 | End: 2023-06-26

## 2023-06-26 RX ORDER — CIPROFLOXACIN HYDROCHLORIDE 3 MG/ML
1 SOLUTION/ DROPS OPHTHALMIC 4 TIMES DAILY
Qty: 2.5 ML | Refills: 0 | Status: SHIPPED | OUTPATIENT
Start: 2023-06-26 | End: 2023-06-29

## 2023-06-26 RX ORDER — PROTAMINE SULFATE 10 MG/ML
INJECTION, SOLUTION INTRAVENOUS
Status: DISCONTINUED | OUTPATIENT
Start: 2023-06-26 | End: 2023-06-26

## 2023-06-26 RX ORDER — HYDROMORPHONE HYDROCHLORIDE 1 MG/ML
0.2 INJECTION, SOLUTION INTRAMUSCULAR; INTRAVENOUS; SUBCUTANEOUS EVERY 5 MIN PRN
Status: DISCONTINUED | OUTPATIENT
Start: 2023-06-26 | End: 2023-06-26 | Stop reason: HOSPADM

## 2023-06-26 RX ORDER — HEPARIN SODIUM 10000 [USP'U]/100ML
INJECTION, SOLUTION INTRAVENOUS CONTINUOUS PRN
Status: DISCONTINUED | OUTPATIENT
Start: 2023-06-26 | End: 2023-06-26

## 2023-06-26 RX ADMIN — DEXMEDETOMIDINE HYDROCHLORIDE 10 MCG: 100 INJECTION, SOLUTION INTRAVENOUS at 10:06

## 2023-06-26 RX ADMIN — CIPROFLOXACIN 1 DROP: 3 SOLUTION OPHTHALMIC at 01:06

## 2023-06-26 RX ADMIN — SODIUM CHLORIDE: 9 INJECTION, SOLUTION INTRAVENOUS at 07:06

## 2023-06-26 RX ADMIN — HEPARIN SODIUM 2000 UNITS: 1000 INJECTION, SOLUTION INTRAVENOUS; SUBCUTANEOUS at 09:06

## 2023-06-26 RX ADMIN — ACETAMINOPHEN 650 MG: 325 TABLET ORAL at 12:06

## 2023-06-26 RX ADMIN — HEPARIN SODIUM 4000 UNITS: 1000 INJECTION, SOLUTION INTRAVENOUS; SUBCUTANEOUS at 08:06

## 2023-06-26 RX ADMIN — HYDROMORPHONE HYDROCHLORIDE 0.2 MG: 1 INJECTION, SOLUTION INTRAMUSCULAR; INTRAVENOUS; SUBCUTANEOUS at 01:06

## 2023-06-26 RX ADMIN — GABAPENTIN 300 MG: 300 CAPSULE ORAL at 12:06

## 2023-06-26 RX ADMIN — FLUORESCEIN SODIUM AND BENOXINATE HYDROCHLORIDE 2 DROP: 2.6; 4.4 SOLUTION/ DROPS OPHTHALMIC at 11:06

## 2023-06-26 RX ADMIN — HEPARIN SODIUM 13000 UNITS: 1000 INJECTION, SOLUTION INTRAVENOUS; SUBCUTANEOUS at 08:06

## 2023-06-26 RX ADMIN — ONDANSETRON 4 MG: 2 INJECTION INTRAMUSCULAR; INTRAVENOUS at 11:06

## 2023-06-26 RX ADMIN — PROTAMINE SULFATE 65 MG: 10 INJECTION, SOLUTION INTRAVENOUS at 10:06

## 2023-06-26 RX ADMIN — HEPARIN SODIUM 12 UNITS/KG/HR: 10000 INJECTION, SOLUTION INTRAVENOUS at 08:06

## 2023-06-26 RX ADMIN — PROPOFOL 150 MCG/KG/MIN: 10 INJECTION, EMULSION INTRAVENOUS at 07:06

## 2023-06-26 RX ADMIN — HYDROMORPHONE HYDROCHLORIDE 0.2 MG: 1 INJECTION, SOLUTION INTRAMUSCULAR; INTRAVENOUS; SUBCUTANEOUS at 12:06

## 2023-06-26 NOTE — ANESTHESIA PROCEDURE NOTES
Arterial    Diagnosis: SVT    Patient location during procedure: done in OR    Staffing  Authorizing Provider: Shasta Cai MD  Performing Provider: Shasta Cai MD    Anesthesiologist was present at the time of the procedure.    Preanesthetic Checklist  Completed: patient identified, IV checked, site marked, risks and benefits discussed, surgical consent, monitors and equipment checked, pre-op evaluation, timeout performed and anesthesia consent givenArterial  Skin Prep: chlorhexidine gluconate  Orientation: right  Location: radial    Catheter Size: 20 G  Catheter placement by Anatomical landmarks. Heme positive aspiration all ports. Insertion Attempts: 3  Assessment  Patient: Tolerated well  Additional Notes  Unsuccessful at placing radial arterial line on right and left due to small, calcified arteries. Pressure held. Cardiologist placed femoral arterial line

## 2023-06-26 NOTE — ANESTHESIA POSTPROCEDURE EVALUATION
Anesthesia Post Evaluation    Patient: Sheryl Carter    Procedure(s) Performed: Procedure(s) (LRB):  Ablation (N/A)    Final Anesthesia Type: general      Patient location during evaluation: PACU  Patient participation: Yes- Able to Participate  Level of consciousness: awake and alert and oriented  Post-procedure vital signs: reviewed and stable  Pain management: adequate  Airway patency: patent    PONV status at discharge: nausea (controlled)  Anesthetic complications: yes  Perioperative Events: corneal abrasion      Katie-operative Events Comments: Pt c/o red, scratchy, irritated eyes bilaterally.  Fluoroscein applied which gave relief. No overt abrasion seen, but pt given corneal abrasion info sheet and Rx for cipro drops  Cardiovascular status: blood pressure returned to baseline and hemodynamically stable  Respiratory status: unassisted and spontaneous ventilation  Hydration status: euvolemic  Follow-up not needed.          Vitals Value Taken Time   /92 06/26/23 1415   Temp 36.6 °C (97.9 °F) 06/26/23 1350   Pulse 77 06/26/23 1534   Resp 18 06/26/23 1515   SpO2 96 % 06/26/23 1515         No case tracking events are documented in the log.      Pain/Joan Score: Pain Rating Prior to Med Admin: 7 (6/26/2023  1:27 PM)  Pain Rating Post Med Admin: 5 (6/26/2023  1:45 PM)  Joan Score: 10 (6/26/2023  2:15 PM)

## 2023-06-26 NOTE — DISCHARGE SUMMARY
[facility]  Electrophysiology  Discharge Summary      Patient Name: Sheryl Carter  MRN: [id number]  Admission Date: 6/26/2023  Hospital Length of Stay: 0 days  Discharge Date and Time:  06/26/2023   Attending Physician: Jeffry Diaz MD    Discharging Provider: Elliott [facility]      [facility] Course:  Patient presented for ablation of symptomatic SVT and PVCs. Successful RFA to slow pathway for treatment of AVNRT. RFA for treatment of PVCs with suppression of PVCs but some transient PVCs after ablation. PVCs localized to septaltricuspid valve annulus. RFA with suppressive effect of clinical VPC. The earliest activation was 3-4 o'clock on the tricuspid annulus. Ablation here resulted in suppression and ultimate reduction in PVCs. Further ablation more superior was performed with caution to avoid His/RBBB potentials. No further ablation was performed. Post ablation rhythm was sinus rhythm. LVOT mapping via R femoral access was done by interventional cardiology.    Discharged Condition: good    Disposition:   Home    Follow Up:  Dr. Diaz in [facility] in 4-6 weeks.    Patient Instructions:   Take Aspirin 81 mg per day for 30 days after your ablation starting the same day as your procedure  If you are on an anticoagulant (e.g., apixiban [Eliquis], dabigatran [Pradaxa], rivaroxaban [Xarelto], or warfarin [Coumadin], enoxaparin [Lovenox]) or alternative antiplatelet agent (e.g., clopidogrel [Plavix], prasugrel [Effient]) you do not need to take Aspirin for the purposes of your ablation procedure.  Remove the bandages over your groin area the morning after your procedure. You can show after you remove these bandages. Keep the groin sites clean and dry. You do not need to apply ointments or bandages to the area after you remove the bandages.  Do not take baths or submerge your groin area or at least 1 week or when the puncture sites in your groin have completely healed  If oozing from  groin site occurs, apply pressure without letting up for 15 minutes and lay flat for 1 hour. If bleeding has resolved, you can continue to monitor. If the bleeding continues or there is significant swelling or pain in the groin area, please visit the nearest ER for evaluation and treatment. DO NOT STOP TAKING YOUR BLOOD THINNER UNLESS INSTRUCTED BY A PHYSICIAN.   Do not lift anything over 10 lbs for the first week after your procedure, and avoid strenuous activity during this time to allow for the groin sites to heal.  After 1 week, there are no activity restrictions  Please contact the electrophysiology clinic or go to the ER if you experience: severe chest pain, shortness of breath, bleeding that does not stop after pressure applied, or swelling of the groin sites, or any other concerns.     Sheryl MATHIS Carter was given education on their disease process and medications.      Medications:  Reconciled Home Medications:      Medication List        START taking these medications      aspirin 81 MG EC tablet  Commonly known as: ECOTRIN  Take 1 tablet (81 mg total) by mouth once daily.            CONTINUE taking these medications      buPROPion 150 MG TBSR 12 hr tablet  Commonly known as: WELLBUTRIN SR  Wellbutrin SR Take No date recorded No form recorded No frequency recorded No route recorded No set duration recorded No set duration amount recorded active No dosage strength recorded No dosage strength units of measure recorded     fluticasone propionate 50 mcg/actuation nasal spray  Commonly known as: FLONASE  fluticasone propionate Take 1-2 spray(s) (nasal) 2 times per day PRN for 7 days [id number] spray,suspension 2 times per day nasal 7 days suspended 50 mcg/actuation     folic acid 1 MG tablet  Commonly known as: FOLVITE  Take 1 mg by mouth once daily.     gabapentin 300 MG capsule  Commonly known as: NEURONTIN  Take 300 mg by mouth once daily.     hydrocortisone-pramoxine 2.5-1 % Crea  Commonly known as:  ANALPRAM-HC     metFORMIN 500 MG tablet  Commonly known as: GLUCOPHAGE  metformin Take No date recorded No form recorded No frequency recorded No route recorded No set duration recorded No set duration amount recorded active No dosage strength recorded No dosage strength units of measure recorded     methotrexate 2.5 MG Tab  Take by mouth every 7 days.     metoprolol succinate 25 MG 24 hr tablet  Commonly known as: TOPROL-XL  Take 1 tablet (25 mg total) by mouth once daily.     pantoprazole 40 MG tablet  Commonly known as: PROTONIX  Take 40 mg by mouth.     predniSONE 5 MG tablet  Commonly known as: DELTASONE  Take 5 mg by mouth once daily.     thyroid (pork) 60 mg Tab  Commonly known as: ARMOUR THYROID  Take 60 mg by mouth.     vitamin D 1000 units Tab  Commonly known as: VITAMIN D3  Take 1,000 Units by mouth once daily.     XIIDRA 5 % Dpet  Generic drug: lifitegrast            STOP taking these medications      hydrOXYchloroQUINE 200 mg tablet  Commonly known as: PLAQUENIL            ASK your doctor about these medications      IMVEXXY MAINTENANCE PACK 10 mcg Inst  Generic drug: estradioL  Place 1 applicator vaginally twice a week.              Signed:  Elliott Henry MD  Cardiovascular Disease PGY-4  [facility]

## 2023-06-26 NOTE — PLAN OF CARE
"Vss. Sats 99% on room air. Pt aaox4 follows commands. S/p svt ablation. Nixon groins sutures ra, well approx. X1 each groin. Sutures placed at 1045, suture removal time 1445. Bedrest done at 1645. Right arterial access, perclose (proglide) in place at 1030, guaze/trans film noted. Drsg cdi. Palpable pulses noted. Due to void later, denies bladder or pain. Pt complaints of "sand paper feeling to both eyes". Anesthesia dr cortes aware. Drops ordered, fluorescein drops done to each eye. Pt states "felt better after drops".  Md aware that pain came back.  Cipro drops placed in both eyes per md order. Drops sent with pt to [facility] 04.  12 lead ekg done and in chart. Pt's  updated over phone and text messaging system. Pt complaints of "generalized body aches". Prn po and iv pain meds given per md order.  Pt states "I take gabapentin at home". Home dose given po per md order.  Sscu rn aware.  Prior to transfer to [place] [id number], "tolerable". Pt denies n/v at this time. Tolerating sips of water. See flowsheet for full assessment. Pharmacy delivered meds, [facility].   "

## 2023-06-26 NOTE — Clinical Note
An angiography was performed of the right common femoral artery via hand injection with 8 mL of contrast

## 2023-06-26 NOTE — Clinical Note
8 ml of contrast were injected throughout the case. 92 mL of contrast was the total wasted during the case. 100 mL was the total amount used during the case.

## 2023-06-26 NOTE — PROGRESS NOTES
"Dr cortes anesthesia notified that pt waking up from anesthesia. Pt complaints of "eye feeling like sandpaper is in both of them."  Saline drops placed.  Pt states "it does not feel better after the saline drops placed". Nixon eyes red and she can not open to light.  Pt complaints of "nausea". New orders given per md order. Pharmacy called.  4 mg zofran given iv per md order. Will continue to monitor.   "

## 2023-06-26 NOTE — NURSING
Received report from STEPHANE Walker. Patient s/p Ablation, AAOx3. VSS, no c/o pain or discomfort at this time, resp even and unlabored. Sutures to bilateral groins are open to air and CDI. Perclose to right groin. No active bleeding. No hematoma noted. Post procedure protocol reviewed with patient and patient's . Understanding verbalized.  at bedside. Nurse call bell within reach.

## 2023-06-26 NOTE — ANESTHESIA PREPROCEDURE EVALUATION
06/26/2023  Sheryl Carter is a 72 y.o., female.    Past Medical History:   Diagnosis Date    Arthritis     Diffuse cystic mastopathy of left breast 6/25/2023    Diffuse cystic mastopathy of right breast 6/25/2023    HTN (hypertension)     Insulin resistance     Mass of multiple sites of left breast 6/25/2023    Mass of upper outer quadrant of right breast 6/25/2023         Pre-op Assessment    I have reviewed the Patient Summary Reports.     I have reviewed the Nursing Notes. I have reviewed the NPO Status.      Review of Systems  Anesthesia Hx:  No problems with previous Anesthesia    Social:  Non-Smoker    Cardiovascular:   Hypertension Dysrhythmias Over the weekend pt felt light headed/syncopal bc she held her beta blocker for this procedure   Pulmonary:   Shortness of breath    Neurological:  Neurology Normal    Endocrine:   Diabetes        Physical Exam  General: Well nourished, Cooperative, Alert and Oriented    Airway:  Mallampati: II   Mouth Opening: Normal  TM Distance: Normal  Neck ROM: Normal ROM    Dental:  Intact        Anesthesia Plan  Type of Anesthesia, risks & benefits discussed:    Anesthesia Type: Gen Natural Airway  Intra-op Monitoring Plan: Standard ASA Monitors  Post Op Pain Control Plan: multimodal analgesia  Induction:  IV  Informed Consent: Informed consent signed with the Patient and all parties understand the risks and agree with anesthesia plan.  All questions answered.   ASA Score: 2    Ready For Surgery From Anesthesia Perspective.     .

## 2023-06-26 NOTE — BRIEF OP NOTE
: Jeffry Daiz MD  Date of Procedure: 06/26/2023    Post-operative Diagnosis: AVNRT, PVCs    Procedure Performed: RFA to the region of the slow pathway for treatment of AVNRT. RFA for treatment of VPC.    Description of Procedure: The patient was brought to the EP lab in the fasting state. Prepped and draped in sterile fashion. Safety timeout was performed. Sedation administered by anesthesia staff. Ultrasound guided venous access of the bilateral femoral veins was performed. HRA, HIS, and RV catheters placed via left femoral vein access. CS and Ablation catheters via right femoral vein access. Interventional cardiology with R femoral artery access for LVOT mapping. Perclose in place. Diagnostic EP study confirmed AVNRT. RFA to the slow pathway for treatment of AVNRT. Non inducible at end of study.     Clinical VPC localized to septaltricuspid valve annulus. RFA with suppressive effect of clinical VPC. The earliest activation was 3-4 o'clock on the tricuspid annulus. Ablation here resulted in suppression and ultimate reduction in PVCs. Further ablation more superior was performed with caution to avoid His/RBBB potentials. Ablation here was performed up to 30W/60s. There were moments of transient quiescence however PVCs returned, with slightly more superior exit. No further ablation was performed.     Post ablation rhythm was sinus rhythm    EBL: <10 mL    Specimens: none  Complications: no immediate    Plan:  Bedrest x 4 hours  ECG on upon arrival to PACU  Medication changes:  restart metoprolol. Start aspirin 81mg daily for 30 days  Plan for discharge following bedrest if patient tolerating PO intake, voiding, and ambulatory without evidence of complications  Follow up with Dr. Diaz in 4-6 weeks in [place].     The attending physician was present for entire duration of the procedure    Elliott Henry MD  Cardiovascular Disease PGY-4  [facility]

## 2023-06-26 NOTE — NURSING
Bilateral groin sutures removed per protocol. Pt tolerated well. Bilateral groin sites dressed with gauze and tegaderm.

## 2023-06-26 NOTE — NURSING
Patient discharged per MD orders. Instructions given on medications, wound care, activity, signs of infection, when to call MD, and follow up appointments. Pt and spouse verbalized understanding. Telemetry and PIV x2 removed. Transporter transferred patient from unit via wheelchair; pt's  accompanied.

## 2023-06-26 NOTE — PROGRESS NOTES
"Dr cazares anesthesia resident at bedside. Fluorescein drops placed to both eyes.  Pt states "I feel better and can open my eyes, the sand paper feeling went away". Relief given after drops. Dr corets aware.  New orders done and meds ordered and sent to pharmacy by resident. Pt updated by md.   "

## 2023-06-26 NOTE — PROGRESS NOTES
"Dr cortes anesthesia at bedside. Pt states "her eyes felt better after the drops, but the irritation is coming back". New orders done by dr cortes. Awaiting pharmacy to deliver meds.   "

## 2023-06-26 NOTE — H&P
[facility]  Electrophysiology  H&P      Patient Name: Sheryl Carter  MRN: [id number]  Admission Date: 6/26/2023    Subjective:   Patient ID:  Sheryl Carter is a 72 y.o. female here today for EPS/RFA for SVT, possible mapping of LVOT and RVOT for PVCs, and ACOSTA for treatment of SVT.    She was symptomatic yesterday with dizziness, palpitations, and feeling like she was about to faint. Held her metoprolol in preparation for the procedure.    Previous DCCV and/or procedural history:  none    Today's ECG: NSR  Anticoagulation: none   TTE: EF 55% in 2021  Hgb: 12.7  Cr: 1.0  INR: 1.0    History:  71 yo female here for SVT management. She had an episode of syncope which resulted in a cardiac work up. Event monitor revealed sustained short RP tachycardia 175-200 bpm lasting over 2h. She also had very high PVC burden 28%. She feels light headed with the SVT events and subsequently feels drained after the events are over.      Stress echo 8/21:  Stress Echo: All segments became appropriately hypercontractile with   physiologic stress. LVEF 65%. No ischemic changes visualized.         Event monitor 4/23:         Study duration: 10 days         Baseline rhythm is NSR            Average diurnal HR is @ 100            Average nocturnal HR is @ 90         SDNN is 96 - c/w moderate vagal tone for age         AF was not detected          There were multiple episodes of what appeared to be AV neymar reentry tachycardia with rates varying between a low of 175 and a high of 220 beats per minute.  The longest such episode lasted 2 hours and 22 minutes.  At least 2 episodes were symptomatic as the patient enter the event correlating with these.         No episodes of sustained VT were noted but the PVC burden was 28%.         Two symptomatic events submitted.  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     Past Medical History:  No date: Arthritis  No date: HTN (hypertension)  No date: Insulin resistance     Past  Surgical History:  No date: LEFT HIP REPLACEMENT  No date: TOTAL ABDOMINAL HYSTERECTOMY W/ BILATERAL SALPINGOOPHORECTOMY     Social History    Socioeconomic History      Marital status:     Tobacco Use      Smoking status: Former      Smokeless tobacco: Never    Substance and Sexual Activity      Alcohol use: Yes      Drug use: Never      Sexual activity: Yes        Birth control/protection: Post-menopausal     Review of patient's family history indicates:     Current Outpatient Medications:  buPROPion (WELLBUTRIN SR) 150 MG TBSR 12 hr tablet, Wellbutrin SR Take No date recorded No form recorded No frequency recorded No route recorded No set duration recorded No set duration amount recorded active No dosage strength recorded No dosage strength units of measure recorded, Disp: , Rfl:   estradioL (IMVEXXY MAINTENANCE PACK) 10 mcg Inst, Place 1 applicator vaginally twice a week., Disp: 8 each, Rfl: 11  fluticasone propionate (FLONASE) 50 mcg/actuation nasal spray, fluticasone propionate Take 1-2 spray(s) (nasal) 2 times per day PRN for 7 days [id number] spray,suspension 2 times per day nasal 7 days suspended 50 mcg/actuation, Disp: , Rfl:   hydrocortisone-pramoxine (ANALPRAM-HC) 2.5-1 % Crea, , Disp: , Rfl:   hydrOXYchloroQUINE (PLAQUENIL) 200 mg tablet, Take 1 tablet by mouth every morning., Disp: , Rfl:   metFORMIN (GLUCOPHAGE) 500 MG tablet, metformin Take No date recorded No form recorded No frequency recorded No route recorded No set duration recorded No set duration amount recorded active No dosage strength recorded No dosage strength units of measure recorded, Disp: , Rfl:   metoprolol succinate (TOPROL-XL) 25 MG 24 hr tablet, Take 1 tablet (25 mg total) by mouth once daily., Disp: 30 tablet, Rfl: 11  pantoprazole (PROTONIX) 40 MG tablet, Take 40 mg by mouth., Disp: , Rfl:   thyroid, pork, (ARMOUR THYROID) 60 mg Tab, Take 60 mg by mouth., Disp: , Rfl:   traMADoL (ULTRAM) 50 mg tablet, Take 50 mg by mouth 2  (two) times daily as needed., Disp: , Rfl:   vitamin D (VITAMIN D3) 1000 units Tab, Take 1,000 Units by mouth once daily., Disp: , Rfl:   XIIDRA 5 % Dpet, , Disp: , Rfl:      No current facility-administered medications for this visit.        Review of Systems   Constitutional: Negative for fever and malaise/fatigue.   HENT:  Negative for sore throat.    Eyes:  Negative for blurred vision.   Cardiovascular:  Negative for claudication, chest pain, cyanosis, dyspnea on exertion, irregular heartbeat, leg swelling, near-syncope, orthopnea, palpitations, paroxysmal nocturnal dyspnea and syncope.   Respiratory:  Negative for cough and hemoptysis.    Hematologic/Lymphatic: Negative for bleeding problem.   Skin:  Negative for rash.   Musculoskeletal:  Negative for falls.   Gastrointestinal:  Negative for abdominal pain.   Genitourinary: Negative.    Neurological: Negative.    Psychiatric/Behavioral:  Negative for altered mental status and substance abuse.       Objective:   Physical Exam  Vitals reviewed.   Constitutional:       General: She is not in acute distress.     Appearance: She is well-developed.   HENT:      Head: Normocephalic and atraumatic.   Eyes:      Pupils: Pupils are equal, round, and reactive to light.   Neck:      Thyroid: No thyromegaly.      Vascular: No JVD.   Cardiovascular:      Rate and Rhythm: Normal rate and regular rhythm.      Chest Wall: PMI is not displaced.      Heart sounds: Normal heart sounds, S1 normal and S2 normal. No murmur heard.    No friction rub. No gallop.   Pulmonary:      Effort: Pulmonary effort is normal. No respiratory distress.      Breath sounds: Normal breath sounds. No wheezing or rales.   Abdominal:      General: Bowel sounds are normal. There is no distension.      Palpations: Abdomen is soft.      Tenderness: There is no abdominal tenderness. There is no guarding or rebound.   Musculoskeletal:         General: No tenderness. Normal range of motion.      Cervical back:  Normal range of motion.   Skin:     General: Skin is warm and dry.      Findings: No erythema or rash.   Neurological:      Mental Status: She is alert and oriented to person, place, and time.      Cranial Nerves: No cranial nerve deficit.   Psychiatric:         Behavior: Behavior normal.         Thought Content: Thought content normal.         Judgment: Judgment normal.      Assessment:      1. Supraventricular tachycardia    2. PVCs (premature ventricular contractions)       Plan:      72 yoF here for SVT management. She has documented, symptomatic, short RP tachycardia over 200 bpm. I recommended EPS/RFA for definitive therapy. She also has outflow tract PVCs at 28% burden. I offered concomitant PVC ablation. Extensive discussion regarding risks, benefits, and alternative approaches to the procedure was had with the patient.  The patient voices understanding and all questions have been answered.  The patient would like to proceed as planned.     EPS/RFA for SVT  Prepare to map LVOT and RVOT for PVCs  ACOSTA    Consented for EPS, RFA ablation, and [facility] for possible LVOT mapping.     Elliott Henry MD  Cardiovascular Disease PGY-4  [facility]

## 2023-06-26 NOTE — TRANSFER OF CARE
"Anesthesia Transfer of Care Note    Patient: Sheryl Carter    Procedure(s) Performed: Procedure(s) (LRB):  Ablation (N/A)    Patient location: PACU    Anesthesia Type: general    Transport from OR: Transported from OR on room air with adequate spontaneous ventilation    Post pain: adequate analgesia    Post assessment: no apparent anesthetic complications    Post vital signs: stable    Level of consciousness: awake    Nausea/Vomiting: no nausea/vomiting    Complications: none    Transfer of care protocol was followed      Last vitals:   Visit Vitals  BP (!) 99/58 (BP Location: Right arm, Patient Position: Lying)   Pulse 79   Temp 36.6 °C (97.9 °F) (Temporal)   Resp 18   Ht 5' 4.5" (1.638 m)   Wt 65.3 kg (144 lb)   SpO2 100%   Breastfeeding No   BMI 24.34 kg/m²     "

## 2023-06-26 NOTE — NURSING
Pt ambulated around unit and to restroom. Pt voided. Tolerated walk well. Vital signs remain stable. Post walk US=185/88 HR= 80  No c/o pain or discomfort at this time, resp even and unlabored. Gauze/tegaderm dressing to bilateral groins are CDI. No active bleeding. No hematoma noted.

## 2023-06-26 NOTE — NURSING TRANSFER
Nursing Transfer Note      6/26/2023     Reason patient is being transferred: ep pacu 3 to sscu 04/home    Transfer To: ep pacu 3 to sscu 04    Transfer via stretcher    Transfer with cardiac monitoring, tele box on confirmed by tele tech    Transported by annmarie mejia    Telemetry: Box Number [id number], Rate 76, Rhythm sr, and Telemetry  debbie    Medicines sent: cipro eye drops, asa prescription    Any special needs or follow-up needed: bedrest x6hrs. Done at 1645. Suture removal at 1445. Right perclose hemostasis 1030    Chart send with patient: Yes    Notified: spouse    Patient reassessed at: 6/26/23 1345. Next due 1415  1  Upon arrival to floor: cardiac monitor applied, patient oriented to room, call bell in reach, and bed in lowest position

## 2023-06-27 LAB
POC ACTIVATED CLOTTING TIME K: 137 SEC (ref 74–137)
POC ACTIVATED CLOTTING TIME K: 269 SEC (ref 74–137)
POC ACTIVATED CLOTTING TIME K: 335 SEC (ref 74–137)
POC ACTIVATED CLOTTING TIME K: 341 SEC (ref 74–137)
POC ACTIVATED CLOTTING TIME K: 377 SEC (ref 74–137)
SAMPLE: ABNORMAL
SAMPLE: NORMAL

## 2023-07-02 PROBLEM — R92.0 MAMMOGRAPHIC MICROCALCIFICATION: Status: ACTIVE | Noted: 2023-07-02

## 2023-07-02 PROBLEM — Z80.3 FAMILY HISTORY OF MALIGNANT NEOPLASM OF BREAST: Status: ACTIVE | Noted: 2023-07-02

## 2023-07-03 ENCOUNTER — OFFICE VISIT (OUTPATIENT)
Dept: SURGERY | Facility: CLINIC | Age: 73
End: 2023-07-03
Payer: MEDICARE

## 2023-07-03 DIAGNOSIS — N60.12 DIFFUSE CYSTIC MASTOPATHY OF LEFT BREAST: ICD-10-CM

## 2023-07-03 DIAGNOSIS — R92.0 MAMMOGRAPHIC MICROCALCIFICATION: ICD-10-CM

## 2023-07-03 DIAGNOSIS — Z80.3 FAMILY HISTORY OF MALIGNANT NEOPLASM OF BREAST: ICD-10-CM

## 2023-07-03 DIAGNOSIS — N63.11 MASS OF UPPER OUTER QUADRANT OF RIGHT BREAST: Primary | ICD-10-CM

## 2023-07-03 DIAGNOSIS — N60.11 DIFFUSE CYSTIC MASTOPATHY OF RIGHT BREAST: ICD-10-CM

## 2023-07-03 DIAGNOSIS — N63.20 MASS OF MULTIPLE SITES OF LEFT BREAST: ICD-10-CM

## 2023-07-03 LAB — RECOMMENDATION:: NORMAL

## 2023-07-03 PROCEDURE — 1160F PR REVIEW ALL MEDS BY PRESCRIBER/CLIN PHARMACIST DOCUMENTED: ICD-10-PCS | Mod: CPTII,S$GLB,, | Performed by: NURSE PRACTITIONER

## 2023-07-03 PROCEDURE — 99213 OFFICE O/P EST LOW 20 MIN: CPT | Mod: S$GLB,,, | Performed by: NURSE PRACTITIONER

## 2023-07-03 PROCEDURE — 99213 PR OFFICE/OUTPT VISIT, EST, LEVL III, 20-29 MIN: ICD-10-PCS | Mod: S$GLB,,, | Performed by: NURSE PRACTITIONER

## 2023-07-03 PROCEDURE — 1159F PR MEDICATION LIST DOCUMENTED IN MEDICAL RECORD: ICD-10-PCS | Mod: CPTII,S$GLB,, | Performed by: NURSE PRACTITIONER

## 2023-07-03 PROCEDURE — 1101F PR PT FALLS ASSESS DOC 0-1 FALLS W/OUT INJ PAST YR: ICD-10-PCS | Mod: CPTII,S$GLB,, | Performed by: NURSE PRACTITIONER

## 2023-07-03 PROCEDURE — 1160F RVW MEDS BY RX/DR IN RCRD: CPT | Mod: CPTII,S$GLB,, | Performed by: NURSE PRACTITIONER

## 2023-07-03 PROCEDURE — 3288F FALL RISK ASSESSMENT DOCD: CPT | Mod: CPTII,S$GLB,, | Performed by: NURSE PRACTITIONER

## 2023-07-03 PROCEDURE — 1126F AMNT PAIN NOTED NONE PRSNT: CPT | Mod: CPTII,S$GLB,, | Performed by: NURSE PRACTITIONER

## 2023-07-03 PROCEDURE — 3288F PR FALLS RISK ASSESSMENT DOCUMENTED: ICD-10-PCS | Mod: CPTII,S$GLB,, | Performed by: NURSE PRACTITIONER

## 2023-07-03 PROCEDURE — 1126F PR PAIN SEVERITY QUANTIFIED, NO PAIN PRESENT: ICD-10-PCS | Mod: CPTII,S$GLB,, | Performed by: NURSE PRACTITIONER

## 2023-07-03 PROCEDURE — 1159F MED LIST DOCD IN RCRD: CPT | Mod: CPTII,S$GLB,, | Performed by: NURSE PRACTITIONER

## 2023-07-03 PROCEDURE — 1101F PT FALLS ASSESS-DOCD LE1/YR: CPT | Mod: CPTII,S$GLB,, | Performed by: NURSE PRACTITIONER

## 2023-07-03 RX ORDER — BUPROPION HYDROCHLORIDE 100 MG/1
TABLET, EXTENDED RELEASE ORAL
COMMUNITY

## 2023-07-03 RX ORDER — CHOLECALCIFEROL (VITAMIN D3) 25 MCG
1 TABLET ORAL EVERY MORNING
COMMUNITY

## 2023-07-03 RX ORDER — FAMOTIDINE 20 MG/1
TABLET, FILM COATED ORAL
Status: ON HOLD | COMMUNITY
Start: 2022-09-12 | End: 2024-01-26

## 2023-07-07 ENCOUNTER — PATIENT MESSAGE (OUTPATIENT)
Dept: CARDIOLOGY | Facility: CLINIC | Age: 73
End: 2023-07-07
Payer: MEDICARE

## 2023-07-10 ENCOUNTER — TELEPHONE (OUTPATIENT)
Dept: PRIMARY CARE CLINIC | Facility: CLINIC | Age: 73
End: 2023-07-10
Payer: MEDICARE

## 2023-07-12 ENCOUNTER — PATIENT MESSAGE (OUTPATIENT)
Dept: PRIMARY CARE CLINIC | Facility: CLINIC | Age: 73
End: 2023-07-12
Payer: MEDICARE

## 2023-07-23 ENCOUNTER — PATIENT MESSAGE (OUTPATIENT)
Dept: PRIMARY CARE CLINIC | Facility: CLINIC | Age: 73
End: 2023-07-23
Payer: MEDICARE

## 2023-07-31 NOTE — PROGRESS NOTES
Subjective:   Patient ID:  Sheryl Carter is a 72 y.o. female who presents for follow-up of SVT      72 yoF here for SVT management.     5/3/23: She had an episode of syncope which resulted in a cardiac work up. Event monitor revealed sustained short RP tachycardia 175-200 bpm lasting over 2h. She also had very high PVC burden 28%. She feels light headed with the SVT events and subsequently feels drained after the events are over.     Interval history: Successful SP modification. PVC source hear His, ablation limited. Symptoms improved. She remains on metoprolol.     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  ·  3D mapping performed with Ensite.  ·  PVCs from right septal tricuspid annulus inferior to His  ·  PVC ablation attempted, limited by proximity to the His    Stress echo 8/21:  Stress Echo: All segments became appropriately hypercontractile with   physiologic stress. LVEF 65%. No ischemic changes visualized.        Event monitor 4/23:         Study duration: 10 days         Baseline rhythm is NSR            Average diurnal HR is @ 100            Average nocturnal HR is @ 90         SDNN is 96 - c/w moderate vagal tone for age         AF was not detected          There were multiple episodes of what appeared to be AV neymar reentry tachycardia with rates varying between a low of 175 and a high of 220 beats per minute.  The longest such episode lasted 2 hours and 22 minutes.  At least 2 episodes were symptomatic as the patient enter the event correlating with these.         No episodes of sustained VT were noted but the PVC burden was 28%.         Two symptomatic events submitted.  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  ·  3D mapping performed with Ensite.  ·  PVCs from right septal tricuspid annulus inferior to His  ·  PVC ablation attempted, limited by proximity to the  His    Past Medical History:  No date: Arthritis  2023: Diffuse cystic mastopathy of left breast  2023: Diffuse cystic mastopathy of right breast  No date: HTN (hypertension)  No date: Insulin resistance  2023: Mass of multiple sites of left breast  2023: Mass of upper outer quadrant of right breast  No date: SVT (supraventricular tachycardia)    Past Surgical History:  2023: ABLATION; N/A      Comment:  Procedure: Ablation;  Surgeon: Jeffry Diaz MD;                 Location: [facility] EP LAB;  Service: Cardiology;  Laterality:               N/A;  PVC/SVT, RFA, ACOSTA, anes, MB, 3 Prep  2023: ABLATION, SVT, SLOW PATHWAY MODIFICATION FOR AVNRT      Comment:  Procedure: Ablation, SVT, Slow Pathway Modification For                AVNRT;  Surgeon: Jeffry Diaz MD;  Location: [facility]                EP LAB;  Service: Cardiology;;  No date: LEFT HIP REPLACEMENT  No date: TOTAL ABDOMINAL HYSTERECTOMY W/ BILATERAL SALPINGOOPHORECTOMY    Social History    Socioeconomic History      Marital status:     Tobacco Use      Smoking status: Former        Packs/day: 0        Types: Cigarettes        Quit date:         Years since quittin.5      Smokeless tobacco: Never    Substance and Sexual Activity      Alcohol use: Not Currently      Drug use: Never      Sexual activity: Yes        Birth control/protection: Post-menopausal      Review of patient's family history indicates:  Problem: Hypertension      Relation: Father          Age of Onset: (Not Specified)  Problem: Diabetes      Relation: Paternal Grandmother          Age of Onset: (Not Specified)  Problem: Heart disease      Relation: Paternal Grandmother          Age of Onset: (Not Specified)  Problem: Breast cancer      Relation: Paternal Aunt          Age of Onset: 73  Problem: Ovarian cancer      Relation: Neg Hx          Age of Onset: (Not Specified)      Current Outpatient Medications:  aspirin (ECOTRIN) 81 MG EC  tablet, Take 1 tablet (81 mg total) by mouth once daily., Disp: 30 tablet, Rfl: 11  buPROPion (WELLBUTRIN SR) 100 MG TBSR 12 hr tablet, Wellbutrin SR Take (oral) 2 times per day No date recorded tablet sustained-release 12 hr 2 times per day oral No set duration recorded No set duration amount recorded active 100 mg, Disp: , Rfl:   estradioL (IMVEXXY MAINTENANCE PACK) 10 mcg Inst, Place 1 applicator vaginally twice a week., Disp: 8 each, Rfl: 11  ESTRADIOL ACETATE VAGL, estradiol acetate Take No date recorded No form recorded No frequency recorded No route recorded No set duration recorded No set duration amount recorded active No dosage strength recorded No dosage strength units of measure recorded, Disp: , Rfl:   famotidine (PEPCID) 20 MG tablet, famotidine Take 1 Tablet (oral) 2 times per day for 7 days [id number] tablet 2 times per day oral 7 days suspended 20 mg, Disp: , Rfl:   fluticasone propionate (FLONASE) 50 mcg/actuation nasal spray, fluticasone propionate Take 1-2 spray(s) (nasal) 2 times per day PRN for 7 days [id number] spray,suspension 2 times per day nasal 7 days suspended 50 mcg/actuation, Disp: , Rfl:   folic acid (FOLVITE) 1 MG tablet, Take 1 mg by mouth once daily., Disp: , Rfl:   gabapentin (NEURONTIN) 300 MG capsule, Take 300 mg by mouth once daily., Disp: , Rfl:   hydrocortisone-pramoxine (ANALPRAM-HC) 2.5-1 % Crea, , Disp: , Rfl:   metFORMIN (GLUCOPHAGE) 500 MG tablet, metformin Take No date recorded No form recorded No frequency recorded No route recorded No set duration recorded No set duration amount recorded active No dosage strength recorded No dosage strength units of measure recorded, Disp: , Rfl:   methotrexate 2.5 MG Tab, Take by mouth every 7 days., Disp: , Rfl:   metoprolol succinate (TOPROL-XL) 25 MG 24 hr tablet, Take 1 tablet (25 mg total) by mouth once daily., Disp: 30 tablet, Rfl: 11  pantoprazole (PROTONIX) 40 MG tablet, Take 40 mg by mouth., Disp: , Rfl:   thyroid, pork, (ARMOUR  THYROID) 60 mg Tab, Take 60 mg by mouth., Disp: , Rfl:   vitamin D (VITAMIN D3) 1000 units Tab, Take 1,000 Units by mouth once daily., Disp: , Rfl:   vitamin D (VITAMIN D3) 1000 units Tab, Take 1 tablet by mouth every morning., Disp: , Rfl:   XIIDRA 5 % Dpet, , Disp: , Rfl:     No current facility-administered medications for this visit.        Review of Systems   Constitutional: Negative for fever and malaise/fatigue.   HENT:  Negative for sore throat.    Eyes:  Negative for blurred vision.   Cardiovascular:  Negative for chest pain, claudication, cyanosis, dyspnea on exertion, irregular heartbeat, leg swelling, near-syncope, orthopnea, palpitations, paroxysmal nocturnal dyspnea and syncope.   Respiratory:  Negative for cough and hemoptysis.    Hematologic/Lymphatic: Negative for bleeding problem.   Skin:  Negative for rash.   Musculoskeletal:  Negative for falls.   Gastrointestinal:  Negative for abdominal pain.   Genitourinary: Negative.    Neurological: Negative.    Psychiatric/Behavioral:  Negative for altered mental status and substance abuse.        Objective:   Physical Exam  Vitals reviewed.   Constitutional:       General: She is not in acute distress.     Appearance: She is well-developed.   HENT:      Head: Normocephalic and atraumatic.   Eyes:      Pupils: Pupils are equal, round, and reactive to light.   Neck:      Thyroid: No thyromegaly.      Vascular: No JVD.   Cardiovascular:      Rate and Rhythm: Normal rate and regular rhythm.      Chest Wall: PMI is not displaced.      Heart sounds: Normal heart sounds, S1 normal and S2 normal. No murmur heard.     No friction rub. No gallop.   Pulmonary:      Effort: Pulmonary effort is normal. No respiratory distress.      Breath sounds: Normal breath sounds. No wheezing or rales.   Abdominal:      General: Bowel sounds are normal. There is no distension.      Palpations: Abdomen is soft.      Tenderness: There is no abdominal tenderness. There is no guarding  or rebound.   Musculoskeletal:         General: No tenderness. Normal range of motion.      Cervical back: Normal range of motion.   Skin:     General: Skin is warm and dry.      Findings: No erythema or rash.   Neurological:      Mental Status: She is alert and oriented to person, place, and time.      Cranial Nerves: No cranial nerve deficit.   Psychiatric:         Behavior: Behavior normal.         Thought Content: Thought content normal.         Judgment: Judgment normal.       ECG: NSR nl AK, QRS, QTc    Assessment:      1. PVCs (premature ventricular contractions)    2. Supraventricular tachycardia        Plan:     72 yoF here for SVT  and PVC follow up. No recurrence. Will reassess PVC burden with 24h holter. Continue metoprolol 25 mg qd. RTC 6m

## 2023-08-01 ENCOUNTER — PATIENT MESSAGE (OUTPATIENT)
Dept: CARDIOLOGY | Facility: CLINIC | Age: 73
End: 2023-08-01
Payer: MEDICARE

## 2023-08-01 DIAGNOSIS — I47.10 SUPRAVENTRICULAR TACHYCARDIA: Primary | ICD-10-CM

## 2023-08-01 DIAGNOSIS — R00.2 PALPITATIONS: ICD-10-CM

## 2023-08-01 DIAGNOSIS — R55 SYNCOPE AND COLLAPSE: ICD-10-CM

## 2023-08-02 ENCOUNTER — HOSPITAL ENCOUNTER (OUTPATIENT)
Dept: CARDIOLOGY | Facility: HOSPITAL | Age: 73
Discharge: HOME | End: 2023-08-02
Attending: INTERNAL MEDICINE
Payer: MEDICARE

## 2023-08-02 ENCOUNTER — OFFICE VISIT (OUTPATIENT)
Dept: CARDIOLOGY | Facility: CLINIC | Age: 73
End: 2023-08-02
Payer: MEDICARE

## 2023-08-02 VITALS
SYSTOLIC BLOOD PRESSURE: 118 MMHG | WEIGHT: 144.19 LBS | HEIGHT: 65 IN | HEART RATE: 60 BPM | DIASTOLIC BLOOD PRESSURE: 70 MMHG | BODY MASS INDEX: 24.02 KG/M2

## 2023-08-02 DIAGNOSIS — I49.3 PVCS (PREMATURE VENTRICULAR CONTRACTIONS): Primary | ICD-10-CM

## 2023-08-02 DIAGNOSIS — R55 SYNCOPE AND COLLAPSE: ICD-10-CM

## 2023-08-02 DIAGNOSIS — I47.10 SUPRAVENTRICULAR TACHYCARDIA: ICD-10-CM

## 2023-08-02 DIAGNOSIS — I49.3 PVCS (PREMATURE VENTRICULAR CONTRACTIONS): ICD-10-CM

## 2023-08-02 DIAGNOSIS — R00.2 PALPITATIONS: ICD-10-CM

## 2023-08-02 PROCEDURE — 3074F SYST BP LT 130 MM HG: CPT | Mod: CPTII,S$GLB,, | Performed by: INTERNAL MEDICINE

## 2023-08-02 PROCEDURE — 99999 PR PBB SHADOW E&M-EST. PATIENT-LVL III: ICD-10-PCS | Mod: PBBFAC,,, | Performed by: INTERNAL MEDICINE

## 2023-08-02 PROCEDURE — 3008F PR BODY MASS INDEX (BMI) DOCUMENTED: ICD-10-PCS | Mod: CPTII,S$GLB,, | Performed by: INTERNAL MEDICINE

## 2023-08-02 PROCEDURE — 99999 PR PBB SHADOW E&M-EST. PATIENT-LVL III: CPT | Mod: PBBFAC,,, | Performed by: INTERNAL MEDICINE

## 2023-08-02 PROCEDURE — 3074F PR MOST RECENT SYSTOLIC BLOOD PRESSURE < 130 MM HG: ICD-10-PCS | Mod: CPTII,S$GLB,, | Performed by: INTERNAL MEDICINE

## 2023-08-02 PROCEDURE — 93010 EKG 12-LEAD: ICD-10-PCS | Mod: ,,, | Performed by: INTERNAL MEDICINE

## 2023-08-02 PROCEDURE — 1159F PR MEDICATION LIST DOCUMENTED IN MEDICAL RECORD: ICD-10-PCS | Mod: CPTII,S$GLB,, | Performed by: INTERNAL MEDICINE

## 2023-08-02 PROCEDURE — 99214 PR OFFICE/OUTPT VISIT, EST, LEVL IV, 30-39 MIN: ICD-10-PCS | Mod: S$GLB,,, | Performed by: INTERNAL MEDICINE

## 2023-08-02 PROCEDURE — 3008F BODY MASS INDEX DOCD: CPT | Mod: CPTII,S$GLB,, | Performed by: INTERNAL MEDICINE

## 2023-08-02 PROCEDURE — 93225 XTRNL ECG REC<48 HRS REC: CPT

## 2023-08-02 PROCEDURE — 3078F DIAST BP <80 MM HG: CPT | Mod: CPTII,S$GLB,, | Performed by: INTERNAL MEDICINE

## 2023-08-02 PROCEDURE — 1159F MED LIST DOCD IN RCRD: CPT | Mod: CPTII,S$GLB,, | Performed by: INTERNAL MEDICINE

## 2023-08-02 PROCEDURE — 1126F PR PAIN SEVERITY QUANTIFIED, NO PAIN PRESENT: ICD-10-PCS | Mod: CPTII,S$GLB,, | Performed by: INTERNAL MEDICINE

## 2023-08-02 PROCEDURE — 93010 ELECTROCARDIOGRAM REPORT: CPT | Mod: ,,, | Performed by: INTERNAL MEDICINE

## 2023-08-02 PROCEDURE — 1126F AMNT PAIN NOTED NONE PRSNT: CPT | Mod: CPTII,S$GLB,, | Performed by: INTERNAL MEDICINE

## 2023-08-02 PROCEDURE — 93005 ELECTROCARDIOGRAM TRACING: CPT

## 2023-08-02 PROCEDURE — 3078F PR MOST RECENT DIASTOLIC BLOOD PRESSURE < 80 MM HG: ICD-10-PCS | Mod: CPTII,S$GLB,, | Performed by: INTERNAL MEDICINE

## 2023-08-02 PROCEDURE — 93227 HOLTER MONITOR - 24 HOUR (CUPID ONLY): ICD-10-PCS | Mod: ,,, | Performed by: INTERNAL MEDICINE

## 2023-08-02 PROCEDURE — 99214 OFFICE O/P EST MOD 30 MIN: CPT | Mod: S$GLB,,, | Performed by: INTERNAL MEDICINE

## 2023-08-02 PROCEDURE — 93227 XTRNL ECG REC<48 HR R&I: CPT | Mod: ,,, | Performed by: INTERNAL MEDICINE

## 2023-08-04 LAB
OHS CV EVENT MONITOR DAY: 0
OHS CV HOLTER LENGTH DECIMAL HOURS: 23.98
OHS CV HOLTER LENGTH HOURS: 23
OHS CV HOLTER LENGTH MINUTES: 59
OHS CV HOLTER SINUS AVERAGE HR: 78
OHS CV HOLTER SINUS MAX HR: 119
OHS CV HOLTER SINUS MIN HR: 52

## 2023-08-12 ENCOUNTER — OFFICE VISIT (OUTPATIENT)
Dept: INTERNAL MEDICINE | Facility: CLINIC | Age: 73
End: 2023-08-12
Payer: MEDICARE

## 2023-08-12 ENCOUNTER — LAB VISIT (OUTPATIENT)
Dept: LAB | Facility: HOSPITAL | Age: 73
End: 2023-08-12
Attending: FAMILY MEDICINE
Payer: MEDICARE

## 2023-08-12 VITALS
TEMPERATURE: 98 F | HEIGHT: 65 IN | WEIGHT: 147.5 LBS | SYSTOLIC BLOOD PRESSURE: 124 MMHG | HEART RATE: 73 BPM | BODY MASS INDEX: 24.57 KG/M2 | DIASTOLIC BLOOD PRESSURE: 80 MMHG

## 2023-08-12 DIAGNOSIS — Z79.899 DRUG-INDUCED IMMUNODEFICIENCY: Primary | ICD-10-CM

## 2023-08-12 DIAGNOSIS — M06.9 RHEUMATOID ARTHRITIS, INVOLVING UNSPECIFIED SITE, UNSPECIFIED WHETHER RHEUMATOID FACTOR PRESENT: ICD-10-CM

## 2023-08-12 DIAGNOSIS — I49.3 PVCS (PREMATURE VENTRICULAR CONTRACTIONS): ICD-10-CM

## 2023-08-12 DIAGNOSIS — G47.00 INSOMNIA, UNSPECIFIED TYPE: ICD-10-CM

## 2023-08-12 DIAGNOSIS — I10 HYPERTENSION, UNSPECIFIED TYPE: ICD-10-CM

## 2023-08-12 DIAGNOSIS — E88.819 INSULIN RESISTANCE: ICD-10-CM

## 2023-08-12 DIAGNOSIS — D84.821 DRUG-INDUCED IMMUNODEFICIENCY: Primary | ICD-10-CM

## 2023-08-12 DIAGNOSIS — R79.9 ABNORMAL FINDING OF BLOOD CHEMISTRY, UNSPECIFIED: ICD-10-CM

## 2023-08-12 DIAGNOSIS — I47.10 SUPRAVENTRICULAR TACHYCARDIA: ICD-10-CM

## 2023-08-12 DIAGNOSIS — E78.5 HYPERLIPIDEMIA, UNSPECIFIED HYPERLIPIDEMIA TYPE: ICD-10-CM

## 2023-08-12 DIAGNOSIS — Z12.11 SCREEN FOR COLON CANCER: ICD-10-CM

## 2023-08-12 DIAGNOSIS — Z78.0 POSTMENOPAUSAL: ICD-10-CM

## 2023-08-12 PROBLEM — M81.0 OP (OSTEOPOROSIS): Status: ACTIVE | Noted: 2023-08-12

## 2023-08-12 PROBLEM — F41.9 ANXIETY DISORDER: Status: ACTIVE | Noted: 2023-08-12

## 2023-08-12 PROBLEM — M79.7 FIBROMYOSITIS: Status: ACTIVE | Noted: 2023-08-12

## 2023-08-12 LAB
CHOLEST SERPL-MCNC: 250 MG/DL (ref 120–199)
CHOLEST/HDLC SERPL: 2.7 {RATIO} (ref 2–5)
ESTIMATED AVG GLUCOSE: 97 MG/DL (ref 68–131)
HBA1C MFR BLD: 5 % (ref 4–5.6)
HDLC SERPL-MCNC: 92 MG/DL (ref 40–75)
HDLC SERPL: 36.8 % (ref 20–50)
LDLC SERPL CALC-MCNC: 135.4 MG/DL (ref 63–159)
NONHDLC SERPL-MCNC: 158 MG/DL
TRIGL SERPL-MCNC: 113 MG/DL (ref 30–150)
TSH SERPL DL<=0.005 MIU/L-ACNC: 0.75 UIU/ML (ref 0.4–4)

## 2023-08-12 PROCEDURE — 99204 PR OFFICE/OUTPT VISIT, NEW, LEVL IV, 45-59 MIN: ICD-10-PCS | Mod: S$GLB,,, | Performed by: FAMILY MEDICINE

## 2023-08-12 PROCEDURE — 84443 ASSAY THYROID STIM HORMONE: CPT | Performed by: FAMILY MEDICINE

## 2023-08-12 PROCEDURE — 3288F PR FALLS RISK ASSESSMENT DOCUMENTED: ICD-10-PCS | Mod: CPTII,S$GLB,, | Performed by: FAMILY MEDICINE

## 2023-08-12 PROCEDURE — 1125F AMNT PAIN NOTED PAIN PRSNT: CPT | Mod: CPTII,S$GLB,, | Performed by: FAMILY MEDICINE

## 2023-08-12 PROCEDURE — 1160F PR REVIEW ALL MEDS BY PRESCRIBER/CLIN PHARMACIST DOCUMENTED: ICD-10-PCS | Mod: CPTII,S$GLB,, | Performed by: FAMILY MEDICINE

## 2023-08-12 PROCEDURE — 3008F PR BODY MASS INDEX (BMI) DOCUMENTED: ICD-10-PCS | Mod: CPTII,S$GLB,, | Performed by: FAMILY MEDICINE

## 2023-08-12 PROCEDURE — 3079F DIAST BP 80-89 MM HG: CPT | Mod: CPTII,S$GLB,, | Performed by: FAMILY MEDICINE

## 2023-08-12 PROCEDURE — 1159F PR MEDICATION LIST DOCUMENTED IN MEDICAL RECORD: ICD-10-PCS | Mod: CPTII,S$GLB,, | Performed by: FAMILY MEDICINE

## 2023-08-12 PROCEDURE — 99999 PR PBB SHADOW E&M-EST. PATIENT-LVL IV: ICD-10-PCS | Mod: PBBFAC,,, | Performed by: FAMILY MEDICINE

## 2023-08-12 PROCEDURE — 3074F SYST BP LT 130 MM HG: CPT | Mod: CPTII,S$GLB,, | Performed by: FAMILY MEDICINE

## 2023-08-12 PROCEDURE — 1160F RVW MEDS BY RX/DR IN RCRD: CPT | Mod: CPTII,S$GLB,, | Performed by: FAMILY MEDICINE

## 2023-08-12 PROCEDURE — 3079F PR MOST RECENT DIASTOLIC BLOOD PRESSURE 80-89 MM HG: ICD-10-PCS | Mod: CPTII,S$GLB,, | Performed by: FAMILY MEDICINE

## 2023-08-12 PROCEDURE — 3288F FALL RISK ASSESSMENT DOCD: CPT | Mod: CPTII,S$GLB,, | Performed by: FAMILY MEDICINE

## 2023-08-12 PROCEDURE — 1125F PR PAIN SEVERITY QUANTIFIED, PAIN PRESENT: ICD-10-PCS | Mod: CPTII,S$GLB,, | Performed by: FAMILY MEDICINE

## 2023-08-12 PROCEDURE — 36415 COLL VENOUS BLD VENIPUNCTURE: CPT | Performed by: FAMILY MEDICINE

## 2023-08-12 PROCEDURE — 1100F PR PT FALLS ASSESS DOC 2+ FALLS/FALL W/INJURY/YR: ICD-10-PCS | Mod: CPTII,S$GLB,, | Performed by: FAMILY MEDICINE

## 2023-08-12 PROCEDURE — 80061 LIPID PANEL: CPT | Performed by: FAMILY MEDICINE

## 2023-08-12 PROCEDURE — 1159F MED LIST DOCD IN RCRD: CPT | Mod: CPTII,S$GLB,, | Performed by: FAMILY MEDICINE

## 2023-08-12 PROCEDURE — 83036 HEMOGLOBIN GLYCOSYLATED A1C: CPT | Performed by: FAMILY MEDICINE

## 2023-08-12 PROCEDURE — 1100F PTFALLS ASSESS-DOCD GE2>/YR: CPT | Mod: CPTII,S$GLB,, | Performed by: FAMILY MEDICINE

## 2023-08-12 PROCEDURE — 3074F PR MOST RECENT SYSTOLIC BLOOD PRESSURE < 130 MM HG: ICD-10-PCS | Mod: CPTII,S$GLB,, | Performed by: FAMILY MEDICINE

## 2023-08-12 PROCEDURE — 99204 OFFICE O/P NEW MOD 45 MIN: CPT | Mod: S$GLB,,, | Performed by: FAMILY MEDICINE

## 2023-08-12 PROCEDURE — 99999 PR PBB SHADOW E&M-EST. PATIENT-LVL IV: CPT | Mod: PBBFAC,,, | Performed by: FAMILY MEDICINE

## 2023-08-12 PROCEDURE — 3008F BODY MASS INDEX DOCD: CPT | Mod: CPTII,S$GLB,, | Performed by: FAMILY MEDICINE

## 2023-08-12 RX ORDER — TRAZODONE HYDROCHLORIDE 50 MG/1
50 TABLET ORAL NIGHTLY
Qty: 30 TABLET | Refills: 11 | Status: SHIPPED | OUTPATIENT
Start: 2023-08-12 | End: 2024-08-11

## 2023-08-12 NOTE — PROGRESS NOTES
Sheryl Carter  08/12/2023  [id number]    Ashley Ayon MD  Patient Care Team:  Ashley Ayon MD as PCP - General (Family Medicine)          Visit Type:a scheduled routine follow-up visit    Chief Complaint:  Chief Complaint   Patient presents with    Establish Care       History of Present Illness:  72 year old    Sees Joe for history of SvT and PVC.  She is taking metoprolol daily.       She is on Vaginal Estradiol  Vaginal dryness, uses 2 twice.    She is on metformin.    She remains on [place] Thyroid  60 mg, NP thyroid    She has RA  Reports triggers after her Hyst.  She was seen with Dr. Lopez  Had some remission.  Past June, had more pain in feet and hand pain.    She saw Dr. Ruiz for pain management  She is now going to see Dr. Rose, Rheum  She is on Methotrexate. She is no having more flares.  Will discuss Biologic.        She saw Dr. Curtis and Dr. Clancy  She has a history of bilateral breast biopsies. The most recent left breast core biopsies were in June 2019 and were benign showing a benign fibroadenoma and papillomatosis. In December 2019 she was noted with a suspicious right breast mass and snonocore biopsy revealed a hyalinized fibroadenoma, occasional calcifications with NEM  MMG is order Diagnostic.  She did her MMG at [facility].    She had Upper GI 3 years ago.  She thinks she is due for the colon.    She has IR.  Metfromin.   She takes Wellbutrin to help with weight loss and stable of mood        History:  Past Medical History:   Diagnosis Date    Arthritis     Diffuse cystic mastopathy of left breast 06/25/2023    Diffuse cystic mastopathy of right breast 06/25/2023    HTN (hypertension)     Insulin resistance     Mass of multiple sites of left breast 06/25/2023    Mass of upper outer quadrant of right breast 06/25/2023    SVT (supraventricular tachycardia)      Past Surgical History:   Procedure Laterality Date    ABLATION N/A 06/26/2023    Procedure: Ablation;  Surgeon: Jeffry  MARTHA Diaz MD;  Location: [facility] EP LAB;  Service: Cardiology;  Laterality: N/A;  PVC/SVT, RFA, ACOSTA, anes, MB, 3 Prep    ABLATION, SVT, SLOW PATHWAY MODIFICATION FOR AVNRT  2023    Procedure: Ablation, SVT, Slow Pathway Modification For AVNRT;  Surgeon: Jeffry Diaz MD;  Location: [facility] EP LAB;  Service: Cardiology;;    BREAST SURGERY      biopsy    EYE SURGERY      cataract removal    HYSTERECTOMY  2010    JOINT REPLACEMENT  2017    LEFT HIP REPLACEMENT      TOTAL ABDOMINAL HYSTERECTOMY W/ BILATERAL SALPINGOOPHORECTOMY       Family History   Problem Relation Age of Onset    Hypertension Father     Cancer Father     Diabetes Paternal Grandmother     Heart disease Paternal Grandmother     Breast cancer Paternal Aunt 73    Arthritis Mother     Miscarriages / Stillbirths Mother     Vision loss Maternal Grandfather     Arthritis Maternal Grandmother     Cancer Paternal Aunt     Cancer Paternal Uncle     COPD Paternal Uncle     Ovarian cancer Neg Hx      Social History     Socioeconomic History    Marital status:    Tobacco Use    Smoking status: Former     Current packs/day: 0.00     Types: Cigarettes     Quit date:      Years since quittin.6    Smokeless tobacco: Never   Substance and Sexual Activity    Alcohol use: Not Currently    Drug use: Never    Sexual activity: Yes     Partners: Male     Birth control/protection: See Surgical Hx     Patient Active Problem List   Diagnosis    Supraventricular tachycardia    PVCs (premature ventricular contractions)    Mass of upper outer quadrant of right breast    Mass of multiple sites of left breast    Diffuse cystic mastopathy of right breast    Diffuse cystic mastopathy of left breast    Mammographic microcalcification    Family history of malignant neoplasm of breast    Drug-induced immunodeficiency    Fibromyositis    Hyperlipidemia    Hypertension    Insulin resistance    OP (osteoporosis)    RA (rheumatoid arthritis)    Anxiety disorder      Review of patient's allergies indicates:   Allergen Reactions    Codeine Nausea Only     Other reaction(s): Hallucinations, anxious    Diphenhydramine hcl Rash     Other reaction(s): Rash, Rash, makes me anxious    Meperidine Nausea Only     Other reaction(s): Vomiting    Codeine phosphate Hallucinations       The following were reviewed at this visit: active problem list, medication list, allergies, family history, social history, and health maintenance.    Medications:  Current Outpatient Medications on File Prior to Visit   Medication Sig Dispense Refill    aspirin (ECOTRIN) 81 MG EC tablet Take 1 tablet (81 mg total) by mouth once daily. 30 tablet 11    buPROPion (WELLBUTRIN SR) 100 MG TBSR 12 hr tablet Wellbutrin SR Take (oral) 2 times per day No date recorded tablet sustained-release 12 hr 2 times per day oral No set duration recorded No set duration amount recorded active 100 mg      estradioL (IMVEXXY MAINTENANCE PACK) 10 mcg Inst Place 1 applicator vaginally twice a week. 8 each 11    ESTRADIOL ACETATE VAGL estradiol acetate Take No date recorded No form recorded No frequency recorded No route recorded No set duration recorded No set duration amount recorded active No dosage strength recorded No dosage strength units of measure recorded      famotidine (PEPCID) 20 MG tablet famotidine Take 1 Tablet (oral) 2 times per day for 7 days 20220912 tablet 2 times per day oral 7 days suspended 20 mg      fluticasone propionate (FLONASE) 50 mcg/actuation nasal spray fluticasone propionate Take 1-2 spray(s) (nasal) 2 times per day PRN for 7 days [id number] spray,suspension 2 times per day nasal 7 days suspended 50 mcg/actuation      folic acid (FOLVITE) 1 MG tablet Take 1 mg by mouth once daily.      gabapentin (NEURONTIN) 300 MG capsule Take 300 mg by mouth once daily.      hydrocortisone-pramoxine (ANALPRAM-HC) 2.5-1 % Crea       metFORMIN (GLUCOPHAGE) 500 MG tablet metformin Take No date recorded No form recorded  No frequency recorded No route recorded No set duration recorded No set duration amount recorded active No dosage strength recorded No dosage strength units of measure recorded      methotrexate 2.5 MG Tab Take by mouth every 7 days.      metoprolol succinate (TOPROL-XL) 25 MG 24 hr tablet Take 1 tablet (25 mg total) by mouth once daily. 30 tablet 11    pantoprazole (PROTONIX) 40 MG tablet Take 40 mg by mouth.      thyroid, pork, (ARMOUR THYROID) 60 mg Tab Take 60 mg by mouth.      vitamin D (VITAMIN D3) 1000 units Tab Take 1,000 Units by mouth once daily.      XIIDRA 5 % Dpet       vitamin D (VITAMIN D3) 1000 units Tab Take 1 tablet by mouth every morning.       No current facility-administered medications on file prior to visit.       Medications have been reviewed and reconciled with patient at this visit.  Barriers to medications reviewed with patient.    Adverse reactions to current medications reviewed with patient..    Over the counter medications reviewed and reconciled with patient.    Exam:  Wt Readings from Last 3 Encounters:   08/12/23 66.9 kg (147 lb 7.8 oz)   08/02/23 65.4 kg (144 lb 2.9 oz)   06/26/23 65.3 kg (144 lb)     Temp Readings from Last 3 Encounters:   08/12/23 98.2 °F (36.8 °C)   06/26/23 97.9 °F (36.6 °C) (Temporal)     BP Readings from Last 3 Encounters:   08/12/23 124/80   08/02/23 118/70   06/26/23 (!) 153/88     Pulse Readings from Last 3 Encounters:   08/12/23 73   08/02/23 60   06/26/23 80     Body mass index is 24.93 kg/m².      Review of Systems   Constitutional: Negative.  Negative for chills and fever.   HENT: Negative.  Negative for congestion, sinus pain and sore throat.    Eyes:  Negative for blurred vision and double vision.   Respiratory:  Negative for cough, sputum production, shortness of breath and wheezing.    Cardiovascular:  Negative for chest pain, palpitations and leg swelling.   Gastrointestinal:  Negative for abdominal pain, constipation, diarrhea, heartburn, nausea  and vomiting.   Genitourinary: Negative.    Musculoskeletal: Negative.    Skin: Negative.  Negative for rash.   Neurological: Negative.    Endo/Heme/Allergies: Negative.  Negative for polydipsia. Does not bruise/bleed easily.   Psychiatric/Behavioral:  Negative for depression and substance abuse.      Physical Exam  Nursing note reviewed.   Cardiovascular:      Rate and Rhythm: Normal rate and regular rhythm.   Pulmonary:      Effort: Pulmonary effort is normal. No respiratory distress.   Neurological:      Mental Status: She is alert and oriented to person, place, and time.   Psychiatric:         Mood and Affect: Mood normal.         Behavior: Behavior normal.         Thought Content: Thought content normal.         Judgment: Judgment normal.         Laboratory Reviewed ({Yes)  Lab Results   Component Value Date    WBC 6.69 06/19/2023    HGB 12.7 06/19/2023    HCT 41.3 06/19/2023     06/19/2023    CHOL 203 (H) 08/02/2010    TRIG 95 08/02/2010    HDL 65 08/02/2010    ALT 23 07/01/2011    AST 17 07/01/2011     06/19/2023    K 5.0 06/19/2023     06/19/2023    CREATININE 1.0 06/19/2023    BUN 12 06/19/2023    CO2 24 06/19/2023    INR 1.0 06/19/2023       Sheryl was seen today for establish care.    Diagnoses and all orders for this visit:    Drug-induced immunodeficiency    PVCs (premature ventricular contractions)    Supraventricular tachycardia    Hypertension, unspecified type  -     Lipid Panel; Future  -     TSH; Future    Rheumatoid arthritis, involving unspecified site, unspecified whether rheumatoid factor present    Postmenopausal  -     DXA Bone Density Axial Skeleton 1 or more sites; Future    Screen for colon cancer  -     Ambulatory referral/consult to Endo Procedure ; Future    Hyperlipidemia, unspecified hyperlipidemia type                Care Plan/Goals: Reviewed    Goals    None         Follow up: No follow-ups on file.    After visit summary was printed and given to  patient upon discharge today.  Patient goals and care plan are included in After Visit Summary.    Addendum:10:40 am  Rapid Response called  She felt weak after her Lab draw    Nurse/MA put ice packs on her.  Some sweating.  No chest pain.  When I arrived, sitting in chair, laughing and talking.  Ascultation of chest in lobby, heart rate, regular, around 90 BPM estimated.    She sat in lobby for 15 min, felt fine, drank liquids and left.  Will drink fluids today  Stay out of heat.

## 2023-08-15 ENCOUNTER — PATIENT MESSAGE (OUTPATIENT)
Dept: INTERNAL MEDICINE | Facility: CLINIC | Age: 73
End: 2023-08-15
Payer: MEDICARE

## 2023-08-16 ENCOUNTER — APPOINTMENT (OUTPATIENT)
Dept: RADIOLOGY | Facility: HOSPITAL | Age: 73
End: 2023-08-16
Attending: FAMILY MEDICINE
Payer: MEDICARE

## 2023-08-16 DIAGNOSIS — Z78.0 POSTMENOPAUSAL: ICD-10-CM

## 2023-08-16 PROCEDURE — 77080 DXA BONE DENSITY AXIAL: CPT | Mod: 26,,, | Performed by: RADIOLOGY

## 2023-08-16 PROCEDURE — 77080 DXA BONE DENSITY AXIAL SKELETON 1 OR MORE SITES: ICD-10-PCS | Mod: 26,,, | Performed by: RADIOLOGY

## 2023-08-16 PROCEDURE — 77080 DXA BONE DENSITY AXIAL: CPT | Mod: TC

## 2023-08-17 RX ORDER — ATORVASTATIN CALCIUM 20 MG/1
20 TABLET, FILM COATED ORAL DAILY
Qty: 90 TABLET | Refills: 3 | Status: ON HOLD | OUTPATIENT
Start: 2023-08-17 | End: 2024-01-26

## 2023-08-21 ENCOUNTER — HOSPITAL ENCOUNTER (OUTPATIENT)
Dept: PREADMISSION TESTING | Facility: HOSPITAL | Age: 73
Discharge: HOME | End: 2023-08-21
Attending: INTERNAL MEDICINE
Payer: MEDICARE

## 2023-08-21 DIAGNOSIS — Z12.11 SCREEN FOR COLON CANCER: Primary | ICD-10-CM

## 2023-08-21 RX ORDER — SODIUM, POTASSIUM,MAG SULFATES 17.5-3.13G
1 SOLUTION, RECONSTITUTED, ORAL ORAL DAILY
Qty: 1 KIT | Refills: 0 | Status: SHIPPED | OUTPATIENT
Start: 2023-08-21 | End: 2023-08-23

## 2023-09-06 ENCOUNTER — PATIENT MESSAGE (OUTPATIENT)
Dept: INTERNAL MEDICINE | Facility: CLINIC | Age: 73
End: 2023-09-06
Payer: MEDICARE

## 2023-09-06 DIAGNOSIS — I10 HYPERTENSION, UNSPECIFIED TYPE: Primary | ICD-10-CM

## 2023-09-06 NOTE — TELEPHONE ENCOUNTER
Refill Routing Note   Medication(s) are not appropriate for processing by [facility] for the following reason(s):      No active prescription written by provider:     [facility] action(s):  Defer Care Due:  Labs due   Medication Therapy Plan:  Labs (CMP) outdated, Pended labs utilizing BestPracticeAdvisor (BPA) tab due to extra training from LPN      Appointments  past 12m or future 3m with PCP    Date Provider   Last Visit   8/12/2023 Michelle Tripp MD   Next Visit   11/15/2023 Michelle Tripp MD   ED visits in past 90 days: 0        Note composed:4:56 PM 09/06/2023

## 2023-09-06 NOTE — TELEPHONE ENCOUNTER
Care Due:                  Date            Visit Type   Department     Provider  --------------------------------------------------------------------------------                                NP -                              PRIMARY      [facility] INTERNAL  Last Visit: 08-      CARE ([facility])   ED Tripp                              EP -                              PRIMARY      [facility] INTERNAL  Next Visit: 11-      CARE ([facility])   ED Tripp                                                            Last  Test          Frequency    Reason                     Performed    Due Date  --------------------------------------------------------------------------------    CMP.........  12 months..  atorvastatin.............  Not Found    Overdue    [employer] Embedded Care Due Messages. Reference number: [id number].   9/06/2023 12:44:27 PM CDT

## 2023-09-07 RX ORDER — THYROID 60 MG/1
60 TABLET ORAL
Qty: 90 TABLET | Refills: 3 | Status: SHIPPED | OUTPATIENT
Start: 2023-09-07

## 2023-09-12 ENCOUNTER — OFFICE VISIT (OUTPATIENT)
Dept: CARDIOLOGY | Facility: CLINIC | Age: 73
End: 2023-09-12
Payer: MEDICARE

## 2023-09-12 VITALS
DIASTOLIC BLOOD PRESSURE: 82 MMHG | SYSTOLIC BLOOD PRESSURE: 120 MMHG | HEART RATE: 79 BPM | OXYGEN SATURATION: 96 % | HEIGHT: 65 IN | WEIGHT: 149.25 LBS | BODY MASS INDEX: 24.87 KG/M2

## 2023-09-12 DIAGNOSIS — I47.10 SUPRAVENTRICULAR TACHYCARDIA: Primary | ICD-10-CM

## 2023-09-12 DIAGNOSIS — I47.10 SVT (SUPRAVENTRICULAR TACHYCARDIA): ICD-10-CM

## 2023-09-12 DIAGNOSIS — M06.9 RHEUMATOID ARTHRITIS, INVOLVING UNSPECIFIED SITE, UNSPECIFIED WHETHER RHEUMATOID FACTOR PRESENT: ICD-10-CM

## 2023-09-12 DIAGNOSIS — I10 HYPERTENSION, UNSPECIFIED TYPE: ICD-10-CM

## 2023-09-12 DIAGNOSIS — Z86.79 STATUS POST RADIOFREQUENCY ABLATION FOR ARRHYTHMIA: ICD-10-CM

## 2023-09-12 DIAGNOSIS — Z98.890 STATUS POST RADIOFREQUENCY ABLATION FOR ARRHYTHMIA: ICD-10-CM

## 2023-09-12 PROCEDURE — 1159F MED LIST DOCD IN RCRD: CPT | Mod: CPTII,S$GLB,, | Performed by: INTERNAL MEDICINE

## 2023-09-12 PROCEDURE — 3079F PR MOST RECENT DIASTOLIC BLOOD PRESSURE 80-89 MM HG: ICD-10-PCS | Mod: CPTII,S$GLB,, | Performed by: INTERNAL MEDICINE

## 2023-09-12 PROCEDURE — 3008F PR BODY MASS INDEX (BMI) DOCUMENTED: ICD-10-PCS | Mod: CPTII,S$GLB,, | Performed by: INTERNAL MEDICINE

## 2023-09-12 PROCEDURE — 99214 PR OFFICE/OUTPT VISIT, EST, LEVL IV, 30-39 MIN: ICD-10-PCS | Mod: S$GLB,,, | Performed by: INTERNAL MEDICINE

## 2023-09-12 PROCEDURE — 3288F PR FALLS RISK ASSESSMENT DOCUMENTED: ICD-10-PCS | Mod: CPTII,S$GLB,, | Performed by: INTERNAL MEDICINE

## 2023-09-12 PROCEDURE — 3079F DIAST BP 80-89 MM HG: CPT | Mod: CPTII,S$GLB,, | Performed by: INTERNAL MEDICINE

## 2023-09-12 PROCEDURE — 99999 PR PBB SHADOW E&M-EST. PATIENT-LVL IV: ICD-10-PCS | Mod: PBBFAC,,, | Performed by: INTERNAL MEDICINE

## 2023-09-12 PROCEDURE — 3044F HG A1C LEVEL LT 7.0%: CPT | Mod: CPTII,S$GLB,, | Performed by: INTERNAL MEDICINE

## 2023-09-12 PROCEDURE — 99999 PR PBB SHADOW E&M-EST. PATIENT-LVL IV: CPT | Mod: PBBFAC,,, | Performed by: INTERNAL MEDICINE

## 2023-09-12 PROCEDURE — 1101F PT FALLS ASSESS-DOCD LE1/YR: CPT | Mod: CPTII,S$GLB,, | Performed by: INTERNAL MEDICINE

## 2023-09-12 PROCEDURE — 3288F FALL RISK ASSESSMENT DOCD: CPT | Mod: CPTII,S$GLB,, | Performed by: INTERNAL MEDICINE

## 2023-09-12 PROCEDURE — 1126F PR PAIN SEVERITY QUANTIFIED, NO PAIN PRESENT: ICD-10-PCS | Mod: CPTII,S$GLB,, | Performed by: INTERNAL MEDICINE

## 2023-09-12 PROCEDURE — 1159F PR MEDICATION LIST DOCUMENTED IN MEDICAL RECORD: ICD-10-PCS | Mod: CPTII,S$GLB,, | Performed by: INTERNAL MEDICINE

## 2023-09-12 PROCEDURE — 3044F PR MOST RECENT HEMOGLOBIN A1C LEVEL <7.0%: ICD-10-PCS | Mod: CPTII,S$GLB,, | Performed by: INTERNAL MEDICINE

## 2023-09-12 PROCEDURE — 3074F SYST BP LT 130 MM HG: CPT | Mod: CPTII,S$GLB,, | Performed by: INTERNAL MEDICINE

## 2023-09-12 PROCEDURE — 99214 OFFICE O/P EST MOD 30 MIN: CPT | Mod: S$GLB,,, | Performed by: INTERNAL MEDICINE

## 2023-09-12 PROCEDURE — 1126F AMNT PAIN NOTED NONE PRSNT: CPT | Mod: CPTII,S$GLB,, | Performed by: INTERNAL MEDICINE

## 2023-09-12 PROCEDURE — 3074F PR MOST RECENT SYSTOLIC BLOOD PRESSURE < 130 MM HG: ICD-10-PCS | Mod: CPTII,S$GLB,, | Performed by: INTERNAL MEDICINE

## 2023-09-12 PROCEDURE — 1101F PR PT FALLS ASSESS DOC 0-1 FALLS W/OUT INJ PAST YR: ICD-10-PCS | Mod: CPTII,S$GLB,, | Performed by: INTERNAL MEDICINE

## 2023-09-12 PROCEDURE — 3008F BODY MASS INDEX DOCD: CPT | Mod: CPTII,S$GLB,, | Performed by: INTERNAL MEDICINE

## 2023-09-12 RX ORDER — DILTIAZEM HYDROCHLORIDE 120 MG/1
120 CAPSULE, COATED, EXTENDED RELEASE ORAL DAILY
Qty: 30 CAPSULE | Refills: 11 | Status: SHIPPED | OUTPATIENT
Start: 2023-09-12
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
## 2023-12-05 ENCOUNTER — OFFICE VISIT (OUTPATIENT)
Dept: INTERNAL MEDICINE | Facility: CLINIC | Age: 73
End: 2023-12-05
Payer: MEDICARE

## 2023-12-05 VITALS
HEIGHT: 65 IN | OXYGEN SATURATION: 96 % | DIASTOLIC BLOOD PRESSURE: 82 MMHG | BODY MASS INDEX: 24.5 KG/M2 | HEART RATE: 85 BPM | TEMPERATURE: 98 F | SYSTOLIC BLOOD PRESSURE: 132 MMHG | WEIGHT: 147.06 LBS

## 2023-12-05 DIAGNOSIS — L03.115 CELLULITIS OF RIGHT LEG: Primary | ICD-10-CM

## 2023-12-05 DIAGNOSIS — I10 HYPERTENSION, UNSPECIFIED TYPE: ICD-10-CM

## 2023-12-05 PROCEDURE — 99999 PR PBB SHADOW E&M-EST. PATIENT-LVL V: CPT | Mod: PBBFAC,,,

## 2023-12-05 PROCEDURE — 3044F HG A1C LEVEL LT 7.0%: CPT | Mod: CPTII,S$GLB,,

## 2023-12-05 PROCEDURE — 99214 OFFICE O/P EST MOD 30 MIN: CPT | Mod: S$GLB,,,

## 2023-12-05 PROCEDURE — 3075F SYST BP GE 130 - 139MM HG: CPT | Mod: CPTII,S$GLB,,

## 2023-12-05 PROCEDURE — 1126F PR PAIN SEVERITY QUANTIFIED, NO PAIN PRESENT: ICD-10-PCS | Mod: CPTII,S$GLB,,

## 2023-12-05 PROCEDURE — 3075F PR MOST RECENT SYSTOLIC BLOOD PRESS GE 130-139MM HG: ICD-10-PCS | Mod: CPTII,S$GLB,,

## 2023-12-05 PROCEDURE — 1101F PT FALLS ASSESS-DOCD LE1/YR: CPT | Mod: CPTII,S$GLB,,

## 2023-12-05 PROCEDURE — 3079F DIAST BP 80-89 MM HG: CPT | Mod: CPTII,S$GLB,,

## 2023-12-05 PROCEDURE — 99999 PR PBB SHADOW E&M-EST. PATIENT-LVL V: ICD-10-PCS | Mod: PBBFAC,,,

## 2023-12-05 PROCEDURE — 1159F PR MEDICATION LIST DOCUMENTED IN MEDICAL RECORD: ICD-10-PCS | Mod: CPTII,S$GLB,,

## 2023-12-05 PROCEDURE — 1159F MED LIST DOCD IN RCRD: CPT | Mod: CPTII,S$GLB,,

## 2023-12-05 PROCEDURE — 1101F PR PT FALLS ASSESS DOC 0-1 FALLS W/OUT INJ PAST YR: ICD-10-PCS | Mod: CPTII,S$GLB,,

## 2023-12-05 PROCEDURE — 1126F AMNT PAIN NOTED NONE PRSNT: CPT | Mod: CPTII,S$GLB,,

## 2023-12-05 PROCEDURE — 3008F BODY MASS INDEX DOCD: CPT | Mod: CPTII,S$GLB,,

## 2023-12-05 PROCEDURE — 1160F RVW MEDS BY RX/DR IN RCRD: CPT | Mod: CPTII,S$GLB,,

## 2023-12-05 PROCEDURE — 3008F PR BODY MASS INDEX (BMI) DOCUMENTED: ICD-10-PCS | Mod: CPTII,S$GLB,,

## 2023-12-05 PROCEDURE — 99214 PR OFFICE/OUTPT VISIT, EST, LEVL IV, 30-39 MIN: ICD-10-PCS | Mod: S$GLB,,,

## 2023-12-05 PROCEDURE — 3079F PR MOST RECENT DIASTOLIC BLOOD PRESSURE 80-89 MM HG: ICD-10-PCS | Mod: CPTII,S$GLB,,

## 2023-12-05 PROCEDURE — 3288F FALL RISK ASSESSMENT DOCD: CPT | Mod: CPTII,S$GLB,,

## 2023-12-05 PROCEDURE — 1160F PR REVIEW ALL MEDS BY PRESCRIBER/CLIN PHARMACIST DOCUMENTED: ICD-10-PCS | Mod: CPTII,S$GLB,,

## 2023-12-05 PROCEDURE — 3044F PR MOST RECENT HEMOGLOBIN A1C LEVEL <7.0%: ICD-10-PCS | Mod: CPTII,S$GLB,,

## 2023-12-05 PROCEDURE — 3288F PR FALLS RISK ASSESSMENT DOCUMENTED: ICD-10-PCS | Mod: CPTII,S$GLB,,

## 2023-12-05 RX ORDER — ABATACEPT 125 MG/ML
INJECTION, SOLUTION SUBCUTANEOUS
COMMUNITY
Start: 2023-11-29

## 2023-12-05 RX ORDER — CEPHALEXIN 500 MG/1
500 CAPSULE ORAL EVERY 8 HOURS
Qty: 21 CAPSULE | Refills: 0 | Status: SHIPPED | OUTPATIENT
Start: 2023-12-05 | End: 2023-12-12

## 2023-12-05 NOTE — PROGRESS NOTES
Sheryl Carter  12/05/2023  [id number]    Michelle Tripp MD  Patient Care Team:  Michelle Tripp MD as PCP - General (Family Medicine)          Visit Type:an urgent visit for a new problem    Chief Complaint:  Chief Complaint   Patient presents with    Wound Check     Right         History of Present Illness:    she was moving boxes around and one fell, scraping right lower leg.     Went to  twice. She went on 11/29 and 12/01  Was told to use Bactroban BID and monitor for s/s of infection    Pt states that area is more tender to touch and noticed more erythema to the area     History:  Past Medical History:   Diagnosis Date    Arthritis     Diffuse cystic mastopathy of left breast 06/25/2023    Diffuse cystic mastopathy of right breast 06/25/2023    HTN (hypertension)     Insulin resistance     Mass of multiple sites of left breast 06/25/2023    Mass of upper outer quadrant of right breast 06/25/2023    SVT (supraventricular tachycardia)      Past Surgical History:   Procedure Laterality Date    ABLATION N/A 06/26/2023    Procedure: Ablation;  Surgeon: Jeffry Diaz MD;  Location: [facility] EP LAB;  Service: Cardiology;  Laterality: N/A;  PVC/SVT, RFA, ACOSTA, anes, MB, 3 Prep    ABLATION, SVT, SLOW PATHWAY MODIFICATION FOR AVNRT  06/26/2023    Procedure: Ablation, SVT, Slow Pathway Modification For AVNRT;  Surgeon: Jeffry Diaz MD;  Location: [facility] EP LAB;  Service: Cardiology;;    BREAST SURGERY      biopsy    EYE SURGERY      cataract removal    HYSTERECTOMY  2010    JOINT REPLACEMENT  2017    LEFT HIP REPLACEMENT      TOTAL ABDOMINAL HYSTERECTOMY W/ BILATERAL SALPINGOOPHORECTOMY       Family History   Problem Relation Age of Onset    Hypertension Father     Cancer Father     Diabetes Paternal Grandmother     Heart disease Paternal Grandmother     Breast cancer Paternal Aunt 73    Arthritis Mother     Miscarriages / Stillbirths Mother     Vision loss Maternal Grandfather     Arthritis Maternal  Grandmother     Cancer Paternal Aunt     Cancer Paternal Uncle     COPD Paternal Uncle     Ovarian cancer Neg Hx      Social History     Socioeconomic History    Marital status:    Tobacco Use    Smoking status: Former     Current packs/day: 0.00     Types: Cigarettes     Quit date:      Years since quittin.9    Smokeless tobacco: Never   Substance and Sexual Activity    Alcohol use: Not Currently    Drug use: Never    Sexual activity: Yes     Partners: Male     Birth control/protection: See Surgical Hx     Patient Active Problem List   Diagnosis    Supraventricular tachycardia    PVCs (premature ventricular contractions)    Mass of upper outer quadrant of right breast    Mass of multiple sites of left breast    Diffuse cystic mastopathy of right breast    Diffuse cystic mastopathy of left breast    Mammographic microcalcification    Family history of malignant neoplasm of breast    Drug-induced immunodeficiency    Fibromyositis    Hyperlipidemia    Hypertension    Insulin resistance    OP (osteoporosis)    RA (rheumatoid arthritis)    Anxiety disorder     Review of patient's allergies indicates:   Allergen Reactions    Codeine Nausea Only     Other reaction(s): Hallucinations, anxious    Diphenhydramine hcl Rash     Other reaction(s): Rash, Rash, makes me anxious    Meperidine Nausea Only     Other reaction(s): Vomiting    Codeine phosphate Hallucinations       The following were reviewed at this visit: active problem list, medication list, allergies, family history, social history, and health maintenance.    Medications:  Current Outpatient Medications on File Prior to Visit   Medication Sig Dispense Refill    aspirin (ECOTRIN) 81 MG EC tablet Take 1 tablet (81 mg total) by mouth once daily. 30 tablet 11    atorvastatin (LIPITOR) 20 MG tablet Take 1 tablet (20 mg total) by mouth once daily. 90 tablet 3    buPROPion (WELLBUTRIN SR) 100 MG TBSR 12 hr tablet Wellbutrin SR Take (oral) 2 times per day  No date recorded tablet sustained-release 12 hr 2 times per day oral No set duration recorded No set duration amount recorded active 100 mg      diltiaZEM (CARDIZEM CD) 120 MG Cp24 Take 1 capsule (120 mg total) by mouth once daily. 30 capsule 11    estradioL (IMVEXXY MAINTENANCE PACK) 10 mcg Inst Place 1 applicator vaginally twice a week. 8 each 11    ESTRADIOL ACETATE VAGL estradiol acetate Take No date recorded No form recorded No frequency recorded No route recorded No set duration recorded No set duration amount recorded active No dosage strength recorded No dosage strength units of measure recorded      famotidine (PEPCID) 20 MG tablet famotidine Take 1 Tablet (oral) 2 times per day for 7 days 2022 tablet 2 times per day oral 7 days suspended 20 mg      fluticasone propionate (FLONASE) 50 mcg/actuation nasal spray fluticasone propionate Take 1-2 spray(s) (nasal) 2 times per day PRN for 7 days 2021 spray,suspension 2 times per day nasal 7 days suspended 50 mcg/actuation      folic acid (FOLVITE) 1 MG tablet Take 1 mg by mouth once daily.      gabapentin (NEURONTIN) 300 MG capsule Take 300 mg by mouth once daily.      hydrocortisone-pramoxine (ANALPRAM-HC) 2.5-1 % Crea       metFORMIN (GLUCOPHAGE) 500 MG tablet metformin Take No date recorded No form recorded No frequency recorded No route recorded No set duration recorded No set duration amount recorded active No dosage strength recorded No dosage strength units of measure recorded      methotrexate 2.5 MG Tab Take by mouth every 7 days.      ORENCIA CLICKJECT 125 mg/mL AtIn SMARTSI Milliliter(s) SUB-Q Once a Week      pantoprazole (PROTONIX) 40 MG tablet Take 40 mg by mouth.      thyroid, pork, (ARMOUR THYROID) 60 mg Tab Take 1 tablet (60 mg total) by mouth before breakfast. 90 tablet 3    traZODone (DESYREL) 50 MG tablet Take 1 tablet (50 mg total) by mouth every evening. 30 tablet 11    vitamin D (VITAMIN D3) 1000 units Tab Take 1 tablet by mouth  every morning.      XIIDRA 5 % Dpet       vitamin D (VITAMIN D3) 1000 units Tab Take 1,000 Units by mouth once daily.       No current facility-administered medications on file prior to visit.       Medications have been reviewed and reconciled with patient at this visit.  Barriers to medications reviewed with patient.    Adverse reactions to current medications reviewed with patient..    Over the counter medications reviewed and reconciled with patient.    Exam:  Wt Readings from Last 3 Encounters:   12/05/23 66.7 kg (147 lb 0.8 oz)   09/12/23 67.7 kg (149 lb 4 oz)   08/12/23 66.9 kg (147 lb 7.8 oz)     Temp Readings from Last 3 Encounters:   12/05/23 97.9 °F (36.6 °C) (Tympanic)   08/12/23 98.2 °F (36.8 °C)   06/26/23 97.9 °F (36.6 °C) (Temporal)     BP Readings from Last 3 Encounters:   12/05/23 132/82   09/12/23 120/82   08/12/23 124/80     Pulse Readings from Last 3 Encounters:   12/05/23 85   09/12/23 79   08/12/23 73     Body mass index is 24.85 kg/m².      Review of Systems   Skin:  Positive for rash.     Physical Exam  Vitals and nursing note reviewed.   Constitutional:       General: She is not in acute distress.     Appearance: She is well-developed. She is not diaphoretic.   HENT:      Head: Normocephalic and atraumatic.      Right Ear: External ear normal.      Left Ear: External ear normal.   Eyes:      General:         Right eye: No discharge.         Left eye: No discharge.      Conjunctiva/sclera: Conjunctivae normal.      Pupils: Pupils are equal, round, and reactive to light.   Cardiovascular:      Rate and Rhythm: Normal rate and regular rhythm.      Heart sounds: Normal heart sounds. No murmur heard.  Pulmonary:      Effort: Pulmonary effort is normal. No respiratory distress.      Breath sounds: Normal breath sounds. No wheezing.   Musculoskeletal:         General: Signs of injury present.      Left lower leg: Edema present.   Neurological:      Mental Status: She is alert and oriented to  person, place, and time.   Psychiatric:         Behavior: Behavior normal.         Thought Content: Thought content normal.         Judgment: Judgment normal.            Laboratory Reviewed ({Yes)  Lab Results   Component Value Date    WBC 6.69 06/19/2023    HGB 12.7 06/19/2023    HCT 41.3 06/19/2023     06/19/2023    CHOL 250 (H) 08/12/2023    TRIG 113 08/12/2023    HDL 92 (H) 08/12/2023    ALT 23 07/01/2011    AST 17 07/01/2011     06/19/2023    K 5.0 06/19/2023     06/19/2023    CREATININE 1.0 06/19/2023    BUN 12 06/19/2023    CO2 24 06/19/2023    TSH 0.746 08/12/2023    INR 1.0 06/19/2023    HGBA1C 5.0 08/12/2023       Sheryl was seen today for wound check.    Diagnoses and all orders for this visit:    Cellulitis of right leg  -     cephALEXin (KEFLEX) 500 MG capsule; Take 1 capsule (500 mg total) by mouth every 8 (eight) hours. for 7 days  Keep area clean and dry  While at home, do not cover with Band-Aid     Hypertension, unspecified type  At visit, Blood pressure is at goal. Continue current medications        MMG   OPAL signed at visit     Scheduled for colonoscopy next month       Care Plan/Goals: Reviewed    Goals    None         Follow up: No follow-ups on file.    After visit summary was printed and given to patient upon discharge today.  Patient goals and care plan are included in After Visit Summary.

## 2023-12-12 ENCOUNTER — PATIENT OUTREACH (OUTPATIENT)
Dept: ADMINISTRATIVE | Facility: HOSPITAL | Age: 73
End: 2023-12-12
Payer: MEDICARE

## 2023-12-12 NOTE — PROGRESS NOTES
Uploaded OPAL MAMMOGRAM 12/5/2023 to media. Mammogram 7/23/2023 found in media and sent to [facility] to hyper-link.

## 2024-01-22 ENCOUNTER — TELEPHONE (OUTPATIENT)
Dept: PREADMISSION TESTING | Facility: HOSPITAL | Age: 74
End: 2024-01-22
Payer: MEDICARE

## 2024-01-22 NOTE — TELEPHONE ENCOUNTER
----- Message from Blaise Nam MD sent at 1/22/2024  4:20 PM CST -----  Regarding: RE: Clearance for Colonoscopy 01/26   She is okay to undergo her colonoscopy   She can make a visit with me.    Thank you  ----- Message -----  From: Chelsea Ashley RN  Sent: 1/22/2024   3:51 PM CST  To: Blaise Nam MD  Subject: Clearance for Colonoscopy 01/26                  Dr. Nam,  This patient is scheduled for a Colonoscopy this Friday 01/26/2024.   She had an ablation for SVT 06/26/2023 with Dr. Diaz. She states she recently started feeling like her heart rate was fast again.   She states she has had to coordinate her  and sister to be in town to help with bowel prep/transportation and providing care for her for her mother so she wanted me to ask if there was any way you could clear her for the Colonoscopy 01/26 or be able to work her in for an appointment this week for evaluation.   Please advise.  Thank you,  Chelsea Ashley RN  Endoscopy Scheduling/Pre-admit Department

## 2024-01-23 ENCOUNTER — TELEPHONE (OUTPATIENT)
Dept: CARDIOLOGY | Facility: CLINIC | Age: 74
End: 2024-01-23
Payer: MEDICARE

## 2024-01-23 NOTE — TELEPHONE ENCOUNTER
Spoke with the patient regarding getting her an appointment for palpitations and being light headed. The patient stated she would send a message when she was ready to be scheduled.

## 2024-01-26 ENCOUNTER — ANESTHESIA EVENT (OUTPATIENT)
Dept: ENDOSCOPY | Facility: HOSPITAL | Age: 74
End: 2024-01-26
Payer: MEDICARE

## 2024-01-26 ENCOUNTER — HOSPITAL ENCOUNTER (OUTPATIENT)
Facility: HOSPITAL | Age: 74
Discharge: HOME | End: 2024-01-26
Attending: INTERNAL MEDICINE | Admitting: INTERNAL MEDICINE
Payer: MEDICARE

## 2024-01-26 ENCOUNTER — ANESTHESIA (OUTPATIENT)
Dept: ENDOSCOPY | Facility: HOSPITAL | Age: 74
End: 2024-01-26
Payer: MEDICARE

## 2024-01-26 DIAGNOSIS — Z86.010 HISTORY OF COLON POLYPS: Primary | ICD-10-CM

## 2024-01-26 PROBLEM — Z86.0100 HISTORY OF COLON POLYPS: Status: ACTIVE | Noted: 2024-01-26

## 2024-01-26 PROCEDURE — 37000009 HC ANESTHESIA EA ADD 15 MINS: Performed by: INTERNAL MEDICINE

## 2024-01-26 PROCEDURE — 88305 TISSUE EXAM BY PATHOLOGIST: CPT | Mod: 59 | Performed by: PATHOLOGY

## 2024-01-26 PROCEDURE — 25000003 PHARM REV CODE 250: Performed by: INTERNAL MEDICINE

## 2024-01-26 PROCEDURE — 63600175 PHARM REV CODE 636 W HCPCS: Performed by: NURSE ANESTHETIST, CERTIFIED REGISTERED

## 2024-01-26 PROCEDURE — 45385 COLONOSCOPY W/LESION REMOVAL: CPT | Mod: PT | Performed by: INTERNAL MEDICINE

## 2024-01-26 PROCEDURE — 45380 COLONOSCOPY AND BIOPSY: CPT | Mod: 59,PT,, | Performed by: INTERNAL MEDICINE

## 2024-01-26 PROCEDURE — 45380 COLONOSCOPY AND BIOPSY: CPT | Mod: 59,PT | Performed by: INTERNAL MEDICINE

## 2024-01-26 PROCEDURE — 45385 COLONOSCOPY W/LESION REMOVAL: CPT | Mod: PT,,, | Performed by: INTERNAL MEDICINE

## 2024-01-26 PROCEDURE — 27201089 HC SNARE, DISP (ANY): Performed by: INTERNAL MEDICINE

## 2024-01-26 PROCEDURE — 88305 TISSUE EXAM BY PATHOLOGIST: CPT | Mod: 26,,, | Performed by: PATHOLOGY

## 2024-01-26 PROCEDURE — 25000003 PHARM REV CODE 250: Performed by: NURSE ANESTHETIST, CERTIFIED REGISTERED

## 2024-01-26 PROCEDURE — 37000008 HC ANESTHESIA 1ST 15 MINUTES: Performed by: INTERNAL MEDICINE

## 2024-01-26 PROCEDURE — 27201012 HC FORCEPS, HOT/COLD, DISP: Performed by: INTERNAL MEDICINE

## 2024-01-26 RX ORDER — LIDOCAINE HYDROCHLORIDE 10 MG/ML
INJECTION, SOLUTION EPIDURAL; INFILTRATION; INTRACAUDAL; PERINEURAL
Status: DISCONTINUED | OUTPATIENT
Start: 2024-01-26 | End: 2024-01-26

## 2024-01-26 RX ORDER — DEXTROMETHORPHAN/PSEUDOEPHED 2.5-7.5/.8
DROPS ORAL
Status: DISCONTINUED | OUTPATIENT
Start: 2024-01-26 | End: 2024-01-26 | Stop reason: HOSPADM

## 2024-01-26 RX ORDER — CIPROFLOXACIN 500 MG/1
500 TABLET ORAL 2 TIMES DAILY
Qty: 20 TABLET | Refills: 0 | Status: SHIPPED | OUTPATIENT
Start: 2024-01-26 | End: 2024-02-06

## 2024-01-26 RX ORDER — METRONIDAZOLE 250 MG/1
250 TABLET ORAL EVERY 8 HOURS
Qty: 30 TABLET | Refills: 0 | Status: SHIPPED | OUTPATIENT
Start: 2024-01-26 | End: 2024-02-06

## 2024-01-26 RX ORDER — PROPOFOL 10 MG/ML
VIAL (ML) INTRAVENOUS
Status: DISCONTINUED | OUTPATIENT
Start: 2024-01-26 | End: 2024-01-26

## 2024-01-26 RX ORDER — SODIUM CHLORIDE, SODIUM LACTATE, POTASSIUM CHLORIDE, CALCIUM CHLORIDE 600; 310; 30; 20 MG/100ML; MG/100ML; MG/100ML; MG/100ML
INJECTION, SOLUTION INTRAVENOUS CONTINUOUS PRN
Status: DISCONTINUED | OUTPATIENT
Start: 2024-01-26 | End: 2024-01-26

## 2024-01-26 RX ADMIN — PROPOFOL 80 MG: 10 INJECTION, EMULSION INTRAVENOUS at 07:01

## 2024-01-26 RX ADMIN — PROPOFOL 30 MG: 10 INJECTION, EMULSION INTRAVENOUS at 07:01

## 2024-01-26 RX ADMIN — LIDOCAINE HYDROCHLORIDE 50 MG: 10 SOLUTION INTRAVENOUS at 07:01

## 2024-01-26 RX ADMIN — SODIUM CHLORIDE, SODIUM LACTATE, POTASSIUM CHLORIDE, AND CALCIUM CHLORIDE: 600; 310; 30; 20 INJECTION, SOLUTION INTRAVENOUS at 07:01

## 2024-01-26 RX ADMIN — PROPOFOL 40 MG: 10 INJECTION, EMULSION INTRAVENOUS at 07:01

## 2024-01-26 NOTE — PLAN OF CARE
Dr Guerrero came to bedside and discussed findings. NO N/V,  no abdominal pain, no GI bleeding, and vitals stable.  Pt discharged from unit.

## 2024-01-26 NOTE — ANESTHESIA POSTPROCEDURE EVALUATION
Anesthesia Post Evaluation    Patient: Sheryl Carter    Procedure(s) Performed: Procedure(s) (LRB):  COLONOSCOPY (N/A)    Final Anesthesia Type: MAC      Patient location during evaluation: PACU  Patient participation: Yes- Able to Participate  Level of consciousness: awake and alert  Post-procedure vital signs: reviewed and stable  Pain management: adequate  Airway patency: patent    PONV status at discharge: No PONV  Anesthetic complications: no      Cardiovascular status: blood pressure returned to baseline and hemodynamically stable  Respiratory status: unassisted, spontaneous ventilation and room air  Hydration status: euvolemic  Follow-up not needed.              Vitals Value Taken Time   /68 01/26/24 0806   Temp 36.4 °C (97.5 °F) 01/26/24 0806   Pulse 82 01/26/24 0806   Resp 17 01/26/24 0806   SpO2 95 % 01/26/24 0806         No case tracking events are documented in the log.      Pain/Joan Score: No data recorded

## 2024-01-26 NOTE — TRANSFER OF CARE
"Anesthesia Transfer of Care Note    Patient: Sheryl Carter    Procedure(s) Performed: Procedure(s) (LRB):  COLONOSCOPY (N/A)    Patient location: GI    Anesthesia Type: MAC    Transport from OR: Transported from OR on room air with adequate spontaneous ventilation    Post pain: adequate analgesia    Post assessment: no apparent anesthetic complications and tolerated procedure well    Post vital signs: stable    Level of consciousness: awake    Nausea/Vomiting: no nausea/vomiting    Complications: none    Transfer of care protocol was followed      Last vitals: Visit Vitals  BP (!) 140/80 (BP Location: Left arm)   Pulse 96   Temp 36.7 °C (98.1 °F)   Resp 11   Ht 5' 4.45" (1.637 m)   Wt 66.7 kg (147 lb)   SpO2 96%   Breastfeeding No   BMI 24.88 kg/m²     "

## 2024-01-26 NOTE — PROVATION PATIENT INSTRUCTIONS
Discharge Summary/Instructions after an Endoscopic Procedure  Patient Name: Sheryl Carter  Patient MRN: [id number]  Patient YOB: 1950  Friday, January 26, 2024 Meghann Guerrero MD  Dear patient,  As a result of recent federal legislation (The Federal Cures Act), you may   receive lab or pathology results from your procedure in your [facility]   account before your physician is able to contact you. Your physician or   their representative will relay the results to you with their   recommendations at their soonest availability.  Thank you,  RESTRICTIONS:  During your procedure today, you received medications for sedation.  These   medications may affect your judgment, balance and coordination.  Therefore,   for 24 hours, you have the following restrictions:   - DO NOT drive a car, operate machinery, make legal/financial decisions,   sign important papers or drink alcohol.    ACTIVITY:  Today: no heavy lifting, straining or running due to procedural   sedation/anesthesia.  The following day: return to full activity including work.  DIET:  Eat and drink normally unless instructed otherwise.     TREATMENT FOR COMMON SIDE EFFECTS:  - Mild abdominal pain, nausea, belching, bloating or excessive gas:  rest,   eat lightly and use a heating pad.  - Sore Throat: treat with throat lozenges and/or gargle with warm salt   water.  - Because air was used during the procedure, expelling large amounts of air   from your rectum or belching is normal.  - If a bowel prep was taken, you may not have a bowel movement for 1-3 days.    This is normal.  SYMPTOMS TO WATCH FOR AND REPORT TO YOUR PHYSICIAN:  1. Abdominal pain or bloating, other than gas cramps.  2. Chest pain.  3. Back pain.  4. Signs of infection such as: chills or fever occurring within 24 hours   after the procedure.  5. Rectal bleeding, which would show as bright red, maroon, or black stools.   (A tablespoon of blood from the rectum is not serious, especially if    hemorrhoids are present.)  6. Vomiting.  7. Weakness or dizziness.  GO DIRECTLY TO THE NEAREST EMERGENCY ROOM IF YOU HAVE ANY OF THE FOLLOWING:      Difficulty breathing              Chills and/or fever over 101 F   Persistent vomiting and/or vomiting blood   Severe abdominal pain   Severe chest pain   Black, tarry stools   Bleeding- more than one tablespoon   Any other symptom or condition that you feel may need urgent attention  Your doctor recommends these additional instructions:  If any biopsies were taken, your doctors clinic will contact you in 1 to 2   weeks with any results.  - Discharge patient to home (with escort).   - Resume previous diet.   - Continue present medications.   - Await pathology results.   - Repeat colonoscopy in 5 years for surveillance based on pathology results.     - Cipro (ciprofloxacin) 500 mg PO BID for 10 days.   - Flagyl (metronidazole) 250 mg PO TID for 10 days.   - Request previous colonoscopy and or endoscopy reports with pathology.   - Return to primary care physician.  For questions, problems or results please call your physician Meghann Guerrero MD at Work:  [phone]  If you have any questions about the above instructions, call the GI   department at [phone] or call the endoscopy unit at [phone]   from 7am until 3 pm.  [facility] [place], EMERGENCY ROOM PHONE NUMBER:   [phone]  IF A COMPLICATION OR EMERGENCY SITUATION ARISES AND YOU ARE UNABLE TO REACH   YOUR PHYSICIAN - GO DIRECTLY TO THE EMERGENCY ROOM.  I have read or have had read to me these discharge instructions for my   procedure and have received a written copy.  I understand these   instructions and will follow-up with my physician if I have any questions.     __________________________________       _____________________________________  Nurse Signature                                          Patient/Designated   Responsible Party Signature  MD Meghann Carnes  MD Yolanda  1/26/2024 8:11:56 AM  This report has been verified and signed electronically.  Dear patient,  As a result of recent federal legislation (The Federal Cures Act), you may   receive lab or pathology results from your procedure in your [facility]   account before your physician is able to contact you. Your physician or   their representative will relay the results to you with their   recommendations at their soonest availability.  Thank you,  [employer]ATION

## 2024-01-26 NOTE — H&P
PRE PROCEDURE H&P    Patient Name: Sheryl Carter  MRN: [id number]  : 1950  Date of Procedure:  2024  Referring Physician: Michelle Tripp MD  Primary Physician: Michelle Tripp MD  Procedure Physician: Meghann Guerrero MD       Planned Procedure: Colonoscopy  Diagnosis: previous adenomatous polyp      Chief Complaint: Same as above    HPI: Patient is an 73 y.o. female is here for the above. Last colonoscopy . Per patient a polyp removed. No outside records available for review. No Fhx of CRC, on ASA only. Last took ASA yesterday.  at bedside.     Last colonoscopy:   Family history: none  Anticoagulation: ASA    Past Medical History:   Past Medical History:   Diagnosis Date    Arthritis     Diffuse cystic mastopathy of left breast 2023    Diffuse cystic mastopathy of right breast 2023    HTN (hypertension)     Insulin resistance     Mass of multiple sites of left breast 2023    Mass of upper outer quadrant of right breast 2023    SVT (supraventricular tachycardia)         Past Surgical History:  Past Surgical History:   Procedure Laterality Date    ABLATION N/A 2023    Procedure: Ablation;  Surgeon: Jeffry Diaz MD;  Location: [facility] EP LAB;  Service: Cardiology;  Laterality: N/A;  PVC/SVT, RFA, ACOSTA, anes, MB, 3 Prep    ABLATION, SVT, SLOW PATHWAY MODIFICATION FOR AVNRT  2023    Procedure: Ablation, SVT, Slow Pathway Modification For AVNRT;  Surgeon: Jeffry Diaz MD;  Location: [facility] EP LAB;  Service: Cardiology;;    BREAST SURGERY      biopsy    EYE SURGERY      cataract removal    HYSTERECTOMY  2010    JOINT REPLACEMENT  2017    LEFT HIP REPLACEMENT      TOTAL ABDOMINAL HYSTERECTOMY W/ BILATERAL SALPINGOOPHORECTOMY          Home Medications:  Prior to Admission medications    Medication Sig Start Date End Date Taking? Authorizing Provider   aspirin (ECOTRIN) 81 MG EC tablet Take 1 tablet (81 mg total) by mouth once daily. 23  24 Yes Elliott Henry MD   buPROPion (WELLBUTRIN SR) 100 MG TBSR 12 hr tablet Wellbutrin SR Take (oral) 2 times per day No date recorded tablet sustained-release 12 hr 2 times per day oral No set duration recorded No set duration amount recorded active 100 mg   Yes Provider, Historical   diltiaZEM (CARDIZEM CD) 120 MG Cp24 Take 1 capsule (120 mg total) by mouth once daily. 23  Yes Blaise Nam MD   gabapentin (NEURONTIN) 300 MG capsule Take 300 mg by mouth once daily.   Yes Provider, Historical   metFORMIN (GLUCOPHAGE) 500 MG tablet metformin Take No date recorded No form recorded No frequency recorded No route recorded No set duration recorded No set duration amount recorded active No dosage strength recorded No dosage strength units of measure recorded   Yes Provider, Historical   ORENCIA CLICKJECT 125 mg/mL AtIn SMARTSI Milliliter(s) SUB-Q Once a Week 23  Yes Provider, Historical   thyroid, pork, (ARMOUR THYROID) 60 mg Tab Take 1 tablet (60 mg total) by mouth before breakfast. 23  Yes Michelle Tripp MD   traZODone (DESYREL) 50 MG tablet Take 1 tablet (50 mg total) by mouth every evening. 23 Yes Michelle Tripp MD   vitamin D (VITAMIN D3) 1000 units Tab Take 1 tablet by mouth every morning.   Yes Provider, Historical   XIIDRA 5 % Dpet  21  Yes Provider, Historical   fluticasone propionate (FLONASE) 50 mcg/actuation nasal spray fluticasone propionate Take 1-2 spray(s) (nasal) 2 times per day PRN for 7 days 2021 spray,suspension 2 times per day nasal 7 days suspended 50 mcg/actuation 21   Provider, Historical   atorvastatin (LIPITOR) 20 MG tablet Take 1 tablet (20 mg total) by mouth once daily. 23  Michelle Trpip MD   estradioL (IMVEXXY MAINTENANCE PACK) 10 mcg Inst Place 1 applicator vaginally twice a week. 22  Angela Gibbs MD   ESTRADIOL ACETATE VAGL estradiol acetate Take No date recorded No form  recorded No frequency recorded No route recorded No set duration recorded No set duration amount recorded active No dosage strength recorded No dosage strength units of measure recorded  24  Provider, Historical   famotidine (PEPCID) 20 MG tablet famotidine Take 1 Tablet (oral) 2 times per day for 7 days 2022 tablet 2 times per day oral 7 days suspended 20 mg 22  Provider, Historical   folic acid (FOLVITE) 1 MG tablet Take 1 mg by mouth once daily.  24  Provider, Historical   hydrocortisone-pramoxine (ANALPRAM-HC) 2.5-1 % Crea  11/10/21 1/26/24  Provider, Historical   methotrexate 2.5 MG Tab Take by mouth every 7 days.  24  Provider, Historical   pantoprazole (PROTONIX) 40 MG tablet Take 40 mg by mouth.  24  Provider, Historical        Allergies:  Review of patient's allergies indicates:   Allergen Reactions    Codeine Nausea Only     Other reaction(s): Hallucinations, anxious    Diphenhydramine hcl Rash     Other reaction(s): Rash, Rash, makes me anxious  benadryl    Meperidine Nausea Only     Other reaction(s): Vomiting  demerol    Codeine phosphate Hallucinations        Social History:   Social History     Socioeconomic History    Marital status:    Tobacco Use    Smoking status: Former     Current packs/day: 0.00     Types: Cigarettes     Quit date:      Years since quittin.0    Smokeless tobacco: Never   Substance and Sexual Activity    Alcohol use: Not Currently    Drug use: Never    Sexual activity: Yes     Partners: Male     Birth control/protection: See Surgical Hx       Family History:  Family History   Problem Relation Age of Onset    Hypertension Father     Cancer Father     Diabetes Paternal Grandmother     Heart disease Paternal Grandmother     Breast cancer Paternal Aunt 73    Arthritis Mother     Miscarriages / Stillbirths Mother     Vision loss Maternal Grandfather     Arthritis Maternal Grandmother     Cancer Paternal Aunt     Cancer Paternal  "Uncle     COPD Paternal Uncle     Ovarian cancer Neg Hx        ROS: No acute cardiac events, no acute respiratory complaints.     Physical Exam (all patients):    BP (!) 140/80 (BP Location: Left arm)   Pulse 96   Temp 98.1 °F (36.7 °C)   Resp 11   Ht 5' 4.45" (1.637 m)   Wt 66.7 kg (147 lb)   SpO2 96%   Breastfeeding No   BMI 24.88 kg/m²   Lungs: Clear to auscultation bilaterally, respirations unlabored  Heart: Regular rate and rhythm, S1 and S2 normal, no obvious murmurs  Abdomen:         Soft, non-tender, bowel sounds normal, no masses, no organomegaly    Lab Results   Component Value Date    WBC 6.69 06/19/2023    MCV 87 06/19/2023    RDW 20.4 (H) 06/19/2023     06/19/2023    INR 1.0 06/19/2023    GLU 83 06/19/2023    HGBA1C 5.0 08/12/2023    BUN 12 06/19/2023     06/19/2023    K 5.0 06/19/2023     06/19/2023        SEDATION PLAN: per anesthesia      History reviewed, vital signs satisfactory, cardiopulmonary status satisfactory, sedation options, risks and plans have been discussed with the patient  All their questions were answered and the patient agrees to the sedation procedures as planned and the patient is deemed an appropriate candidate for the sedation as planned.    The risks, benefits and alternatives of the procedure were discussed with the patient in detail. This discussion was had in the presence of her  and endoscopy staff. The risks include, risks of adverse reaction to sedation requiring the use of reversal agents, bleeding requiring blood transfusion, perforation requiring surgical intervention and technical failure. Other risks include aspiration leading to respiratory distress and respiratory failure resulting in endotracheal intubation and mechanical ventilation including death. If anesthesia is being utilized for this procedure, it is up to the anesthesiologist to determine airway safety including elective endotracheal intubation. Questions were answered, " they agree to proceed. There was no language barriers.      Procedure explained to patient, informed consent obtained and placed in chart.    Meghann Guerrero  1/26/2024  7:37 AM

## 2024-01-26 NOTE — ANESTHESIA PREPROCEDURE EVALUATION
01/26/2024  Sheryl Carter is a 73 y.o., female.      Pre-op Assessment    I have reviewed the Patient Summary Reports.     I have reviewed the Nursing Notes. I have reviewed the NPO Status.   I have reviewed the Medications.     Review of Systems  Anesthesia Hx:  No problems with previous Anesthesia             Denies Family Hx of Anesthesia complications.    Denies Personal Hx of Anesthesia complications.                    Social:  Non-Smoker       Hematology/Oncology:  Hematology Normal   Oncology Normal                                   EENT/Dental:  EENT/Dental Normal           Cardiovascular:     Hypertension    Dysrhythmias          ECG has been reviewed. SVT                         Pulmonary:  Pulmonary Normal                       Renal/:  Renal/ Normal                 Hepatic/GI:  Hepatic/GI Normal                 Musculoskeletal:  Musculoskeletal Normal                Neurological:    Neuromuscular Disease,                                   Endocrine:  Endocrine Normal            Dermatological:  Skin Normal    Psych:  Psychiatric History  depression                Physical Exam  General: Well nourished, Cooperative, Alert and Oriented    Airway:  Mallampati: II   Mouth Opening: Normal  TM Distance: Normal  Tongue: Normal  Neck ROM: Normal ROM    Dental:  Intact  Pt denies loose or removable dentition  Heart:  Rate: Normal  Rhythm: Regular Rhythm        Anesthesia Plan  Type of Anesthesia, risks & benefits discussed:    Anesthesia Type: MAC, Gen Natural Airway  Intra-op Monitoring Plan: Standard ASA Monitors  Post Op Pain Control Plan: multimodal analgesia  Induction:  IV  Informed Consent: Informed consent signed with the Patient and all parties understand the risks and agree with anesthesia plan.  All questions answered.   ASA Score: 2  Day of Surgery Review of History & Physical: H&P  Update referred to the surgeon/provider.I have interviewed and examined the patient. I have reviewed the patient's H&P dated: There are no significant changes.     Ready For Surgery From Anesthesia Perspective.     .

## 2024-01-29 VITALS
TEMPERATURE: 98 F | HEART RATE: 84 BPM | RESPIRATION RATE: 18 BRPM | BODY MASS INDEX: 25.1 KG/M2 | WEIGHT: 147 LBS | DIASTOLIC BLOOD PRESSURE: 68 MMHG | OXYGEN SATURATION: 96 % | SYSTOLIC BLOOD PRESSURE: 132 MMHG | HEIGHT: 64 IN

## 2024-01-29 LAB
FINAL PATHOLOGIC DIAGNOSIS: NORMAL
GROSS: NORMAL
Lab: NORMAL

## 2024-01-30 ENCOUNTER — PATIENT MESSAGE (OUTPATIENT)
Dept: GASTROENTEROLOGY | Facility: CLINIC | Age: 74
End: 2024-01-30
Payer: MEDICARE

## 2024-02-02 NOTE — PROGRESS NOTES
The polyps removed were precancerous also known as adenomas. They were completely removed. Guidelines recommend a repeat colonoscopy in 5 years. We will send you a reminder as the time nears.      Thanks for trusting us with your healthcare needs and using [facility].     Sincerely,    Meghann Guerrero M.D.

## 2024-02-06 ENCOUNTER — OFFICE VISIT (OUTPATIENT)
Dept: INTERNAL MEDICINE | Facility: CLINIC | Age: 74
End: 2024-02-06
Payer: MEDICARE

## 2024-02-06 ENCOUNTER — PATIENT MESSAGE (OUTPATIENT)
Dept: GASTROENTEROLOGY | Facility: CLINIC | Age: 74
End: 2024-02-06
Payer: MEDICARE

## 2024-02-06 VITALS
DIASTOLIC BLOOD PRESSURE: 78 MMHG | WEIGHT: 149 LBS | TEMPERATURE: 99 F | OXYGEN SATURATION: 97 % | HEIGHT: 64 IN | BODY MASS INDEX: 25.44 KG/M2 | SYSTOLIC BLOOD PRESSURE: 124 MMHG | HEART RATE: 76 BPM

## 2024-02-06 DIAGNOSIS — I10 HYPERTENSION, UNSPECIFIED TYPE: ICD-10-CM

## 2024-02-06 DIAGNOSIS — Z79.899 DRUG-INDUCED IMMUNODEFICIENCY: ICD-10-CM

## 2024-02-06 DIAGNOSIS — M06.9 RHEUMATOID ARTHRITIS, INVOLVING UNSPECIFIED SITE, UNSPECIFIED WHETHER RHEUMATOID FACTOR PRESENT: Primary | ICD-10-CM

## 2024-02-06 DIAGNOSIS — D84.821 DRUG-INDUCED IMMUNODEFICIENCY: ICD-10-CM

## 2024-02-06 PROCEDURE — 99999 PR PBB SHADOW E&M-EST. PATIENT-LVL IV: CPT | Mod: PBBFAC,,,

## 2024-02-06 PROCEDURE — 99214 OFFICE O/P EST MOD 30 MIN: CPT | Mod: S$GLB,,,

## 2024-02-06 PROCEDURE — 1160F RVW MEDS BY RX/DR IN RCRD: CPT | Mod: CPTII,S$GLB,,

## 2024-02-06 PROCEDURE — 3288F FALL RISK ASSESSMENT DOCD: CPT | Mod: CPTII,S$GLB,,

## 2024-02-06 PROCEDURE — 1101F PT FALLS ASSESS-DOCD LE1/YR: CPT | Mod: CPTII,S$GLB,,

## 2024-02-06 PROCEDURE — 3008F BODY MASS INDEX DOCD: CPT | Mod: CPTII,S$GLB,,

## 2024-02-06 PROCEDURE — 3078F DIAST BP <80 MM HG: CPT | Mod: CPTII,S$GLB,,

## 2024-02-06 PROCEDURE — 1159F MED LIST DOCD IN RCRD: CPT | Mod: CPTII,S$GLB,,

## 2024-02-06 PROCEDURE — 1126F AMNT PAIN NOTED NONE PRSNT: CPT | Mod: CPTII,S$GLB,,

## 2024-02-06 PROCEDURE — 3074F SYST BP LT 130 MM HG: CPT | Mod: CPTII,S$GLB,,

## 2024-02-06 RX ORDER — GABAPENTIN 400 MG/1
400 CAPSULE ORAL
COMMUNITY
Start: 2024-01-19 | End: 2024-05-07

## 2024-02-06 NOTE — PROGRESS NOTES
Sheryl Carter  02/06/2024  [id number]    Michelle Tripp MD  Patient Care Team:  Michelle Tripp MD as PCP - General (Family Medicine)  Jeffry Curtis MD as Consulting Physician (Breast Surgery)          Visit Type:an urgent visit for an existing problem     Chief Complaint:  Chief Complaint   Patient presents with    Arthritis    Weight Loss        History of Present Illness:    Has history of RA  Followed by rheumatology externally   Prescribed Gabapentin 400 mg daily   Lat filled on 1/19/24    On biological for RA  States that it is making her gain weight  Interested in semgulatide  States that she is insulin resistance         History:  Past Medical History:   Diagnosis Date    Arthritis     Diffuse cystic mastopathy of left breast 06/25/2023    Diffuse cystic mastopathy of right breast 06/25/2023    HTN (hypertension)     Insulin resistance     Mass of multiple sites of left breast 06/25/2023    Mass of upper outer quadrant of right breast 06/25/2023    SVT (supraventricular tachycardia)      Past Surgical History:   Procedure Laterality Date    ABLATION N/A 06/26/2023    Procedure: Ablation;  Surgeon: Jeffry Diaz MD;  Location: [facility] EP LAB;  Service: Cardiology;  Laterality: N/A;  PVC/SVT, RFA, ACOSTA, anes, MB, 3 Prep    ABLATION, SVT, SLOW PATHWAY MODIFICATION FOR AVNRT  06/26/2023    Procedure: Ablation, SVT, Slow Pathway Modification For AVNRT;  Surgeon: Jeffry Diaz MD;  Location: [facility] EP LAB;  Service: Cardiology;;    BREAST SURGERY      biopsy    COLONOSCOPY N/A 1/26/2024    Procedure: COLONOSCOPY;  Surgeon: Meghann Guerrero MD;  Location: [facility] ENDO;  Service: Endoscopy;  Laterality: N/A;    EYE SURGERY      cataract removal    HYSTERECTOMY  2010    JOINT REPLACEMENT  2017    LEFT HIP REPLACEMENT      TOTAL ABDOMINAL HYSTERECTOMY W/ BILATERAL SALPINGOOPHORECTOMY       Family History   Problem Relation Age of Onset    Hypertension Father     Cancer Father     Diabetes Paternal  Grandmother     Heart disease Paternal Grandmother     Breast cancer Paternal Aunt 73    Arthritis Mother     Miscarriages / Stillbirths Mother     Vision loss Maternal Grandfather     Arthritis Maternal Grandmother     Cancer Paternal Aunt     Cancer Paternal Uncle     COPD Paternal Uncle     Ovarian cancer Neg Hx      Social History     Socioeconomic History    Marital status:    Tobacco Use    Smoking status: Former     Current packs/day: 0.00     Types: Cigarettes     Quit date:      Years since quittin.1    Smokeless tobacco: Never   Substance and Sexual Activity    Alcohol use: Not Currently    Drug use: Never    Sexual activity: Yes     Partners: Male     Birth control/protection: See Surgical Hx     Patient Active Problem List   Diagnosis    Supraventricular tachycardia    PVCs (premature ventricular contractions)    Mass of upper outer quadrant of right breast    Mass of multiple sites of left breast    Diffuse cystic mastopathy of right breast    Diffuse cystic mastopathy of left breast    Mammographic microcalcification    Family history of malignant neoplasm of breast    Drug-induced immunodeficiency    Fibromyositis    Hyperlipidemia    Hypertension    Insulin resistance    OP (osteoporosis)    RA (rheumatoid arthritis)    Anxiety disorder    History of colon polyps     Review of patient's allergies indicates:   Allergen Reactions    Codeine Nausea Only     Other reaction(s): Hallucinations, anxious    Diphenhydramine hcl Rash     Other reaction(s): Rash, Rash, makes me anxious  benadryl    Meperidine Nausea Only     Other reaction(s): Vomiting  demerol    Codeine phosphate Hallucinations       The following were reviewed at this visit: active problem list, medication list, allergies, family history, social history, and health maintenance.    Medications:  Current Outpatient Medications on File Prior to Visit   Medication Sig Dispense Refill    aspirin (ECOTRIN) 81 MG EC tablet Take 1  tablet (81 mg total) by mouth once daily. 30 tablet 11    buPROPion (WELLBUTRIN SR) 100 MG TBSR 12 hr tablet Wellbutrin SR Take (oral) 2 times per day No date recorded tablet sustained-release 12 hr 2 times per day oral No set duration recorded No set duration amount recorded active 100 mg      [] ciprofloxacin HCl (CIPRO) 500 MG tablet Take 1 tablet (500 mg total) by mouth 2 (two) times daily. for 10 days 20 tablet 0    diltiaZEM (CARDIZEM CD) 120 MG Cp24 Take 1 capsule (120 mg total) by mouth once daily. 30 capsule 11    fluticasone propionate (FLONASE) 50 mcg/actuation nasal spray fluticasone propionate Take 1-2 spray(s) (nasal) 2 times per day PRN for 7 days 2021 spray,suspension 2 times per day nasal 7 days suspended 50 mcg/actuation      gabapentin (NEURONTIN) 300 MG capsule Take 300 mg by mouth once daily.      metFORMIN (GLUCOPHAGE) 500 MG tablet metformin Take No date recorded No form recorded No frequency recorded No route recorded No set duration recorded No set duration amount recorded active No dosage strength recorded No dosage strength units of measure recorded      [] metroNIDAZOLE (FLAGYL) 250 MG tablet Take 1 tablet (250 mg total) by mouth every 8 (eight) hours. for 10 days 30 tablet 0    ORENCIA CLICKJECT 125 mg/mL AtIn SMARTSI Milliliter(s) SUB-Q Once a Week      thyroid, pork, (ARMOUR THYROID) 60 mg Tab Take 1 tablet (60 mg total) by mouth before breakfast. 90 tablet 3    traZODone (DESYREL) 50 MG tablet Take 1 tablet (50 mg total) by mouth every evening. 30 tablet 11    vitamin D (VITAMIN D3) 1000 units Tab Take 1 tablet by mouth every morning.      XIIDRA 5 % Dpet        No current facility-administered medications on file prior to visit.       Medications have been reviewed and reconciled with patient at this visit.  Barriers to medications reviewed with patient.    Adverse reactions to current medications reviewed with patient..    Over the counter medications reviewed  and reconciled with patient.    Exam:  Wt Readings from Last 3 Encounters:   01/26/24 66.7 kg (147 lb)   12/05/23 66.7 kg (147 lb 0.8 oz)   09/12/23 67.7 kg (149 lb 4 oz)     Temp Readings from Last 3 Encounters:   01/26/24 97.5 °F (36.4 °C)   12/05/23 97.9 °F (36.6 °C) (Tympanic)   08/12/23 98.2 °F (36.8 °C)     BP Readings from Last 3 Encounters:   01/26/24 132/68   12/05/23 132/82   09/12/23 120/82     Pulse Readings from Last 3 Encounters:   01/26/24 84   12/05/23 85   09/12/23 79     There is no height or weight on file to calculate BMI.      Review of Systems   Musculoskeletal:  Positive for joint pain.     Physical Exam  Vitals and nursing note reviewed.   Constitutional:       General: She is not in acute distress.     Appearance: She is well-developed. She is not diaphoretic.   HENT:      Head: Normocephalic and atraumatic.      Right Ear: External ear normal.      Left Ear: External ear normal.   Eyes:      General:         Right eye: No discharge.         Left eye: No discharge.      Conjunctiva/sclera: Conjunctivae normal.      Pupils: Pupils are equal, round, and reactive to light.   Neck:      Thyroid: No thyromegaly.   Cardiovascular:      Rate and Rhythm: Normal rate and regular rhythm.      Heart sounds: Normal heart sounds. No murmur heard.  Pulmonary:      Effort: Pulmonary effort is normal. No respiratory distress.      Breath sounds: Normal breath sounds. No wheezing.   Musculoskeletal:         General: Tenderness present.   Neurological:      Mental Status: She is alert and oriented to person, place, and time.   Psychiatric:         Behavior: Behavior normal.         Thought Content: Thought content normal.         Judgment: Judgment normal.         Laboratory Reviewed ({Yes)  Lab Results   Component Value Date    WBC 6.69 06/19/2023    HGB 12.7 06/19/2023    HCT 41.3 06/19/2023     06/19/2023    CHOL 250 (H) 08/12/2023    TRIG 113 08/12/2023    HDL 92 (H) 08/12/2023    ALT 23 07/01/2011     AST 17 07/01/2011     06/19/2023    K 5.0 06/19/2023     06/19/2023    CREATININE 1.0 06/19/2023    BUN 12 06/19/2023    CO2 24 06/19/2023    TSH 0.74 01/19/2024    INR 1.0 06/19/2023    HGBA1C 5.0 08/12/2023     Sheryl was seen today for arthritis and weight loss.    Diagnoses and all orders for this visit:    Rheumatoid arthritis, involving unspecified site, unspecified whether rheumatoid factor present  Continue current treatment plan as previously prescribed with your  rheumatologist     Hypertension, unspecified type  At visit, Blood pressure is at goal. Continue current medications      Drug-induced immunodeficiency  Continue current treatment plan as previously prescribed with your  rheumatologist         Explained to patient that her BMI has to be 27 or higher and that she will need to call her insurance company to see if they will cover the RX    Pt states that she will be back once she has gained additional weight to try and get semaglutide approved            Care Plan/Goals: Reviewed    Goals    None         Follow up: No follow-ups on file.    After visit summary was printed and given to patient upon discharge today.  Patient goals and care plan are included in After Visit Summary.

## 2024-02-08 ENCOUNTER — PATIENT MESSAGE (OUTPATIENT)
Dept: GASTROENTEROLOGY | Facility: CLINIC | Age: 74
End: 2024-02-08
Payer: MEDICARE

## 2024-02-09 ENCOUNTER — TELEPHONE (OUTPATIENT)
Dept: GASTROENTEROLOGY | Facility: CLINIC | Age: 74
End: 2024-02-09
Payer: MEDICARE

## 2024-02-09 NOTE — TELEPHONE ENCOUNTER
Requested medical records for pt per Dr Guerrero.     EGD done at [facility] fax# [phone]  Colonoscopy done with Dr Wang at [facility]. Fx # [phone]

## 2024-05-07 ENCOUNTER — OFFICE VISIT (OUTPATIENT)
Dept: CARDIOLOGY | Facility: CLINIC | Age: 74
End: 2024-05-07
Payer: MEDICARE

## 2024-05-07 VITALS
WEIGHT: 148.56 LBS | SYSTOLIC BLOOD PRESSURE: 130 MMHG | OXYGEN SATURATION: 97 % | HEIGHT: 64 IN | DIASTOLIC BLOOD PRESSURE: 84 MMHG | HEART RATE: 92 BPM | BODY MASS INDEX: 25.36 KG/M2

## 2024-05-07 DIAGNOSIS — F41.9 ANXIETY: ICD-10-CM

## 2024-05-07 DIAGNOSIS — I10 HYPERTENSION, UNSPECIFIED TYPE: Primary | ICD-10-CM

## 2024-05-07 DIAGNOSIS — I49.3 PVCS (PREMATURE VENTRICULAR CONTRACTIONS): ICD-10-CM

## 2024-05-07 DIAGNOSIS — I47.10 SUPRAVENTRICULAR TACHYCARDIA: ICD-10-CM

## 2024-05-07 DIAGNOSIS — M06.9 RHEUMATOID ARTHRITIS, INVOLVING UNSPECIFIED SITE, UNSPECIFIED WHETHER RHEUMATOID FACTOR PRESENT: ICD-10-CM

## 2024-05-07 DIAGNOSIS — I47.10 SVT (SUPRAVENTRICULAR TACHYCARDIA): ICD-10-CM

## 2024-05-07 DIAGNOSIS — Z98.890 STATUS POST RADIOFREQUENCY ABLATION FOR ARRHYTHMIA: ICD-10-CM

## 2024-05-07 DIAGNOSIS — Z86.79 STATUS POST RADIOFREQUENCY ABLATION FOR ARRHYTHMIA: ICD-10-CM

## 2024-05-07 PROCEDURE — 3288F FALL RISK ASSESSMENT DOCD: CPT | Mod: CPTII,S$GLB,, | Performed by: INTERNAL MEDICINE

## 2024-05-07 PROCEDURE — 3008F BODY MASS INDEX DOCD: CPT | Mod: CPTII,S$GLB,, | Performed by: INTERNAL MEDICINE

## 2024-05-07 PROCEDURE — 99999 PR PBB SHADOW E&M-EST. PATIENT-LVL III: CPT | Mod: PBBFAC,,, | Performed by: INTERNAL MEDICINE

## 2024-05-07 PROCEDURE — 1101F PT FALLS ASSESS-DOCD LE1/YR: CPT | Mod: CPTII,S$GLB,, | Performed by: INTERNAL MEDICINE

## 2024-05-07 PROCEDURE — 99214 OFFICE O/P EST MOD 30 MIN: CPT | Mod: S$GLB,,, | Performed by: INTERNAL MEDICINE

## 2024-05-07 PROCEDURE — 1126F AMNT PAIN NOTED NONE PRSNT: CPT | Mod: CPTII,S$GLB,, | Performed by: INTERNAL MEDICINE

## 2024-05-07 PROCEDURE — 3075F SYST BP GE 130 - 139MM HG: CPT | Mod: CPTII,S$GLB,, | Performed by: INTERNAL MEDICINE

## 2024-05-07 PROCEDURE — 3079F DIAST BP 80-89 MM HG: CPT | Mod: CPTII,S$GLB,, | Performed by: INTERNAL MEDICINE

## 2024-05-07 PROCEDURE — 1159F MED LIST DOCD IN RCRD: CPT | Mod: CPTII,S$GLB,, | Performed by: INTERNAL MEDICINE

## 2024-05-07 RX ORDER — DILTIAZEM HYDROCHLORIDE 180 MG/1
180 CAPSULE, COATED, EXTENDED RELEASE ORAL DAILY
Qty: 30 CAPSULE | Refills: 11 | Status: SHIPPED | OUTPATIENT
Start: 2024-05-07

## 2024-05-07 RX ORDER — GUAIFENESIN/PHENYLPROPANOLAMIN
1 EXPECTORANT ORAL EVERY MORNING
COMMUNITY
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
## 2024-05-21 RX ORDER — BUPROPION HYDROCHLORIDE 100 MG/1
100 TABLET, EXTENDED RELEASE ORAL 2 TIMES DAILY
Qty: 180 TABLET | Refills: 0 | Status: SHIPPED | OUTPATIENT
Start: 2024-05-21

## 2024-05-21 NOTE — TELEPHONE ENCOUNTER
Care Due:                  Date            Visit Type   Department     Provider  --------------------------------------------------------------------------------                                NP -                              PRIMARY      [facility] INTERNAL  Last Visit: 08-      CARE ([facility])   ED Tripp                              EP -                              PRIMARY      [facility] INTERNAL  Next Visit: 07-      CARE ([facility])   ED Tripp                                                            Last  Test          Frequency    Reason                     Performed    Due Date  --------------------------------------------------------------------------------    TSH.........  12 months..  thyroid,.................  08- 08-    [facility] Embedded Care Due Messages. Reference number: [id number].   5/21/2024 11:24:34 AM CDT

## 2024-05-21 NOTE — TELEPHONE ENCOUNTER
Refill Routing Note   Medication(s) are not appropriate for processing by [facility] for the following reason(s):        No active prescription written by provider    [facility] action(s):  Defer     Requires labs : Yes      Medication Therapy Plan: FOVS      Appointments  past 12m or future 3m with PCP    Date Provider   Last Visit   8/12/2023 Michelle Tripp MD   Next Visit   7/11/2024 Michelle Tripp MD   ED visits in past 90 days: 0        Note composed:4:17 PM 05/21/2024

## 2024-05-21 NOTE — TELEPHONE ENCOUNTER
----- Message from Hemant Grant sent at 5/21/2024 11:17 AM CDT -----  Contact: [phone] .  Pt is calling regarding medication (states she received a note sating her meds can not be refilled ). Please call back at [phone] . Tessiej

## 2024-06-17 DIAGNOSIS — Z80.3 FAMILY HISTORY OF MALIGNANT NEOPLASM OF BREAST: Primary | ICD-10-CM

## 2024-06-17 DIAGNOSIS — N63.11 MASS OF UPPER OUTER QUADRANT OF RIGHT BREAST: ICD-10-CM

## 2024-06-17 NOTE — PROGRESS NOTES
Date of Service: 7/8/2024      Chief Complaint:   Sheryl Carter is a 73 y.o. female presenting today for her annual evaluation.  She is due for a mammogram. She reports no interval changes.     History of Present Illness: Mrs. Carter first presented on September 4, 2019 to establish ongoing breast care. She has a history of bilateral breast biopsies. The most recent left breast core biopsies were in June 2019 and were benign showing a benign fibroadenoma and papillomatosis. In December 2019 she was noted with a suspicious right breast mass and snonocore biopsy revealed a hyalinized fibroadenoma, occasional calcifications with NEM. MD:::Michelle Tripp MD.    Past Medical History:   Diagnosis Date    Arthritis     Diffuse cystic mastopathy of left breast 06/25/2023    Diffuse cystic mastopathy of right breast 06/25/2023    HTN (hypertension)     Insulin resistance     Mass of multiple sites of left breast 06/25/2023    Mass of upper outer quadrant of right breast 06/25/2023    SVT (supraventricular tachycardia)       Past Surgical History:   Procedure Laterality Date    ABLATION N/A 06/26/2023    Procedure: Ablation;  Surgeon: Jeffry Diaz MD;  Location: [facility] EP LAB;  Service: Cardiology;  Laterality: N/A;  PVC/SVT, RFA, ACOSTA, anes, MB, 3 Prep    ABLATION, SVT, SLOW PATHWAY MODIFICATION FOR AVNRT  06/26/2023    Procedure: Ablation, SVT, Slow Pathway Modification For AVNRT;  Surgeon: Jeffry Diaz MD;  Location: [facility] EP LAB;  Service: Cardiology;;    BREAST SURGERY      biopsy    COLONOSCOPY N/A 1/26/2024    Procedure: COLONOSCOPY;  Surgeon: Meghann Guerrero MD;  Location: [facility] ENDO;  Service: Endoscopy;  Laterality: N/A;    EYE SURGERY      cataract removal    HYSTERECTOMY  2010    JOINT REPLACEMENT  2017    LEFT HIP REPLACEMENT      TOTAL ABDOMINAL HYSTERECTOMY W/ BILATERAL SALPINGOOPHORECTOMY          Current Outpatient Medications:     aspirin (ECOTRIN) 81 MG EC tablet, Take 1 tablet (81  mg total) by mouth once daily., Disp: 30 tablet, Rfl: 11    buPROPion (WELLBUTRIN SR) 100 MG TBSR 12 hr tablet, Take 1 tablet (100 mg total) by mouth 2 (two) times daily., Disp: 180 tablet, Rfl: 0    diltiaZEM (CARDIZEM CD) 180 MG 24 hr capsule, Take 1 capsule (180 mg total) by mouth once daily., Disp: 30 capsule, Rfl: 11    fluticasone propionate (FLONASE) 50 mcg/actuation nasal spray, fluticasone propionate Take 1-2 spray(s) (nasal) 2 times per day PRN for 7 days 2021 spray,suspension 2 times per day nasal 7 days suspended 50 mcg/actuation, Disp: , Rfl:     gabapentin (NEURONTIN) 400 MG capsule, Take 400 mg by mouth., Disp: , Rfl:     metFORMIN (GLUCOPHAGE) 500 MG tablet, metformin Take No date recorded No form recorded No frequency recorded No route recorded No set duration recorded No set duration amount recorded active No dosage strength recorded No dosage strength units of measure recorded, Disp: , Rfl:     ORENCIA CLICKJECT 125 mg/mL AtIn, SMARTSI Milliliter(s) SUB-Q Once a Week, Disp: , Rfl:     saw palmetto 500 MG capsule, Take 1 capsule by mouth every morning., Disp: , Rfl:     SEMAGLUTIDE SUBQ, Inject 5 Units into the skin., Disp: , Rfl:     thyroid, pork, (ARMOUR THYROID) 60 mg Tab, Take 1 tablet (60 mg total) by mouth before breakfast., Disp: 90 tablet, Rfl: 3    traZODone (DESYREL) 50 MG tablet, Take 1 tablet (50 mg total) by mouth every evening., Disp: 30 tablet, Rfl: 11    vitamin D (VITAMIN D3) 1000 units Tab, Take 1 tablet by mouth every morning., Disp: , Rfl:     XIIDRA 5 % Dpet, , Disp: , Rfl:    Review of patient's allergies indicates:   Allergen Reactions    Codeine Nausea Only     Other reaction(s): Hallucinations, anxious    Diphenhydramine hcl Rash     Other reaction(s): Rash, Rash, makes me anxious  benadryl    Meperidine Nausea Only     Other reaction(s): Vomiting  demerol    Codeine phosphate Hallucinations      Social History     Tobacco Use    Smoking status: Former     Current  packs/day: 0.00     Types: Cigarettes     Quit date:      Years since quittin.5    Smokeless tobacco: Never   Substance Use Topics    Alcohol use: Not Currently      Family History   Problem Relation Name Age of Onset    Hypertension Father Alverto Phuc     Cancer Father Alverto Phuc     Diabetes Paternal Grandmother Elise Phuc     Heart disease Paternal Grandmother Elise Phuc     Breast cancer Paternal Aunt  73    Arthritis Mother Madai Phuc     Miscarriages / Stillbirths Mother Madai Phuc     Vision loss Maternal Grandfather Madai Wiggins     Arthritis Maternal Grandmother ELISE PHUC     Cancer Paternal Aunt Radha Reeves     Cancer Paternal Uncle Terrance Phuc     COPD Paternal Uncle Terrance Phuc     Ovarian cancer Neg Hx          Review of Systems   Integumentary:  Negative for color change, rash, mole/lesion, thickening/swelling, dimpling of skin, drainage  Breast: Negative for mass and tenderness     Physical Exam   Constitutional: She appears well-developed. She is cooperative.   HENT: Normocephalic.   Cardiovascular:  Normal rate and regular rhythm.            Pulmonary/Chest: She exhibits no tenderness and no bony tenderness.   Abdominal: Soft. Normal appearance.   Musculoskeletal: Intact with no deficits  Neurological: She is alert.   Skin: No rash noted.   Breast and Chest Wall Evaluation:   Right breast exhibits no mass, no nipple discharge, no skin change and no tenderness.     Left breast exhibits no mass, no nipple discharge, no skin change and no tenderness.     Lymphadenopathy: No supraclavicular or axillary adenopathy.    MAMMOGRAM REPORT: She has some diffuse fibronodular tissue; there are no spiculated lesions, distortions or suspicious calcifications noted; NEM    ASSESSMENT and PLAN OF CARE     1. Mass of multiple sites of left breast  Assessment & Plan:  We reviewed our findings today and her questions were answered.  She understands that her imaging and exams have remained stable (and show nothing  concerning).  She is comfortable being followed in a conservative fashion.      She understands the importance of monthly self-breast examination and knows to report any and all changes as they occur.    NOTE:::We viewed her films together at today's visit.  We discussed the multiple views obtained and the important findings.  Even benign changes were mentioned and her questions were answered.  She knows that she may receive a formal letter or report from the Radiologist.  She is to contact us if she has questions.         2. Mammographic microcalcification  Assessment & Plan:  We reviewed our findings today and her questions were answered.  She understands that her imaging and exams have remained stable (and show nothing concerning). She has developed no new areas of suspicious calcifications bilaterally.  She knows that any new or changed calcifications in the future may necessitate the recommendation for tissue sampling. She is comfortable being followed in a conservative fashion.      She understands the importance of monthly self-breast examination and knows to report any and all changes as they occur.       3. Family history of malignant neoplasm of breast  Assessment & Plan:  We discussed her family history and how it could impact her own future risks.  We discussed family vs. genetic history and the importance and implications of each.  Genetic Counseling/Testing was offered, and all questions answered to her satisfaction.  She knows that as additional family members are diagnosed - she will need to let us know as this may change follow up and imaging recommendations.    We had a discussion concerning Breast Cancer Risk Reduction and current NCCN Guidelines. She knows that her risk can be lowered slightly with a healthy lifestyle and minimal ETOH use. Being physically active will also help. She should reduce or stay away from OCPs and HRT as possible.         4. Diffuse cystic mastopathy of left  breast  Assessment & Plan:  We discussed our fibrocystic mastopathy protocol in detail. She knows that if she follows this protocol - that her symptoms should improve.  We discussed how breast pain is usually not associated with breast cancer, however, pain can be the presenting symptom with some cancers (but this could be coincidental). Still, if her pain does not improve in 8-12 weeks she should call us back for additional recommendations.       Medical Decision Making: It is my impression that this patient suffers all conditions contained in this medical document.  Each of these conditions did affect our plan of care and my medical decision making today.  It is my opinion that the medical decision making concerning this patient was of minimal  difficulty based on the aforementioned conditions.  Any further recommendations will be communicated to the patient by me.  I have reviewed and verified her allergies, list of medications, medical and surgical histories, social history, and a pertinent review of symptoms.     Follow up: 1 year and PRN    For: Physical Examination and MGM (D) at

## 2024-07-08 ENCOUNTER — OFFICE VISIT (OUTPATIENT)
Dept: SURGERY | Facility: CLINIC | Age: 74
End: 2024-07-08
Payer: MEDICARE

## 2024-07-08 DIAGNOSIS — Z80.3 FAMILY HISTORY OF MALIGNANT NEOPLASM OF BREAST: ICD-10-CM

## 2024-07-08 DIAGNOSIS — R92.0 MAMMOGRAPHIC MICROCALCIFICATION: ICD-10-CM

## 2024-07-08 DIAGNOSIS — N60.12 DIFFUSE CYSTIC MASTOPATHY OF LEFT BREAST: ICD-10-CM

## 2024-07-08 DIAGNOSIS — N63.20 MASS OF MULTIPLE SITES OF LEFT BREAST: Primary | ICD-10-CM

## 2024-07-08 PROCEDURE — 1126F AMNT PAIN NOTED NONE PRSNT: CPT | Mod: CPTII,S$GLB,, | Performed by: SPECIALIST

## 2024-07-08 PROCEDURE — 99213 OFFICE O/P EST LOW 20 MIN: CPT | Mod: S$GLB,,, | Performed by: SPECIALIST

## 2024-07-08 PROCEDURE — 1160F RVW MEDS BY RX/DR IN RCRD: CPT | Mod: CPTII,S$GLB,, | Performed by: SPECIALIST

## 2024-07-08 PROCEDURE — 1159F MED LIST DOCD IN RCRD: CPT | Mod: CPTII,S$GLB,, | Performed by: SPECIALIST

## 2024-07-08 PROCEDURE — 99999 PR PBB SHADOW E&M-EST. PATIENT-LVL III: CPT | Mod: PBBFAC,,, | Performed by: SPECIALIST

## 2024-07-11 ENCOUNTER — OFFICE VISIT (OUTPATIENT)
Dept: INTERNAL MEDICINE | Facility: CLINIC | Age: 74
End: 2024-07-11
Payer: MEDICARE

## 2024-07-11 VITALS
BODY MASS INDEX: 25.29 KG/M2 | HEIGHT: 64 IN | DIASTOLIC BLOOD PRESSURE: 80 MMHG | TEMPERATURE: 97 F | SYSTOLIC BLOOD PRESSURE: 120 MMHG | HEART RATE: 92 BPM | WEIGHT: 148.13 LBS

## 2024-07-11 DIAGNOSIS — M06.9 RHEUMATOID ARTHRITIS, INVOLVING UNSPECIFIED SITE, UNSPECIFIED WHETHER RHEUMATOID FACTOR PRESENT: ICD-10-CM

## 2024-07-11 DIAGNOSIS — I10 HYPERTENSION, UNSPECIFIED TYPE: ICD-10-CM

## 2024-07-11 DIAGNOSIS — E78.5 HYPERLIPIDEMIA, UNSPECIFIED HYPERLIPIDEMIA TYPE: Primary | ICD-10-CM

## 2024-07-11 PROCEDURE — 3074F SYST BP LT 130 MM HG: CPT | Mod: CPTII,S$GLB,, | Performed by: FAMILY MEDICINE

## 2024-07-11 PROCEDURE — 99214 OFFICE O/P EST MOD 30 MIN: CPT | Mod: S$GLB,,, | Performed by: FAMILY MEDICINE

## 2024-07-11 PROCEDURE — 3079F DIAST BP 80-89 MM HG: CPT | Mod: CPTII,S$GLB,, | Performed by: FAMILY MEDICINE

## 2024-07-11 PROCEDURE — 3008F BODY MASS INDEX DOCD: CPT | Mod: CPTII,S$GLB,, | Performed by: FAMILY MEDICINE

## 2024-07-11 PROCEDURE — 99999 PR PBB SHADOW E&M-EST. PATIENT-LVL II: CPT | Mod: PBBFAC,,, | Performed by: FAMILY MEDICINE

## 2024-07-11 RX ORDER — IPRATROPIUM BROMIDE 42 UG/1
2 SPRAY, METERED NASAL
COMMUNITY
Start: 2024-04-15 | End: 2024-10-12

## 2024-07-11 RX ORDER — GABAPENTIN 600 MG/1
1 TABLET ORAL NIGHTLY
COMMUNITY
Start: 2024-07-09

## 2024-07-11 NOTE — PROGRESS NOTES
Sheryl Carter  07/11/2024  [id number]    Michelle Tripp MD  Patient Care Team:  Michelle Tripp MD as PCP - General (Family Medicine)  Jeffry Curtis MD as Consulting Physician (Breast Surgery)          Visit Type:a scheduled routine follow-up visit    Chief Complaint: Follow up    History of Present Illness:  Recheck 6 month    Seeing her Rheum, Dr. Rose  Possible start arava  RA- she still has flare of arthrits.    She has seen Breast speciality  Did her MMG    Stopped her Chol meds  Recheck this month needed    Health Maintenance Due   Topic Date Due    RSV Vaccine (Age 60+ and Pregnant patients) (1 - 1-dose 60+ series) Never done    Pneumococcal Vaccines (Age 65+) (2 of 2 - PCV) 01/22/2022   Discussed need for Pneumo 20    She is on Semiglutide  From Rejuvame, with Testosterone.  Still constipation with meds.          History:  Past Medical History:   Diagnosis Date    Arthritis     Diffuse cystic mastopathy of left breast 06/25/2023    Diffuse cystic mastopathy of right breast 06/25/2023    HTN (hypertension)     Insulin resistance     Mass of multiple sites of left breast 06/25/2023    Mass of upper outer quadrant of right breast 06/25/2023    SVT (supraventricular tachycardia)      Past Surgical History:   Procedure Laterality Date    ABLATION N/A 06/26/2023    Procedure: Ablation;  Surgeon: Jeffry Diaz MD;  Location: [facility] EP LAB;  Service: Cardiology;  Laterality: N/A;  PVC/SVT, RFA, ACOSTA, anes, MB, 3 Prep    ABLATION, SVT, SLOW PATHWAY MODIFICATION FOR AVNRT  06/26/2023    Procedure: Ablation, SVT, Slow Pathway Modification For AVNRT;  Surgeon: Jeffry Diaz MD;  Location: [facility] EP LAB;  Service: Cardiology;;    BREAST SURGERY      biopsy    COLONOSCOPY N/A 1/26/2024    Procedure: COLONOSCOPY;  Surgeon: Meghann Guerrero MD;  Location: [facility] ENDO;  Service: Endoscopy;  Laterality: N/A;    EYE SURGERY      cataract removal    HYSTERECTOMY  2010    JOINT REPLACEMENT  2017    LEFT  HIP REPLACEMENT      TOTAL ABDOMINAL HYSTERECTOMY W/ BILATERAL SALPINGOOPHORECTOMY       Family History   Problem Relation Name Age of Onset    Hypertension Father Alverto Phuc     Cancer Father Alverto Phuc     Diabetes Paternal Grandmother Elise Phuc     Heart disease Paternal Grandmother Elise Phuc     Breast cancer Paternal Aunt  73    Arthritis Mother Madai Phuc     Miscarriages / Stillbirths Mother Madai Phuc     Vision loss Maternal Grandfather Madai Wiggins     Arthritis Maternal Grandmother ELISE PHUC     Cancer Paternal Aunt Radha Reeves     Cancer Paternal Uncle Terrance Phuc     COPD Paternal Uncle Terrance Phuc     Ovarian cancer Neg Hx       Social History     Socioeconomic History    Marital status:    Tobacco Use    Smoking status: Former     Current packs/day: 0.00     Types: Cigarettes     Quit date:      Years since quittin.5    Smokeless tobacco: Never   Substance and Sexual Activity    Alcohol use: Not Currently    Drug use: Never    Sexual activity: Yes     Partners: Male     Birth control/protection: See Surgical Hx     Patient Active Problem List   Diagnosis    Supraventricular tachycardia    PVCs (premature ventricular contractions)    Mass of upper outer quadrant of right breast    Mass of multiple sites of left breast    Diffuse cystic mastopathy of right breast    Diffuse cystic mastopathy of left breast    Mammographic microcalcification    Family history of malignant neoplasm of breast    Drug-induced immunodeficiency    Fibromyositis    Hyperlipidemia    Hypertension    Insulin resistance    OP (osteoporosis)    RA (rheumatoid arthritis)    Anxiety disorder    History of colon polyps     Review of patient's allergies indicates:   Allergen Reactions    Diphenhydramine hcl Rash     Other reaction(s): Rash, Rash, makes me anxious  benadryl    Meperidine Nausea Only     Other reaction(s): Vomiting  demerol    Codeine phosphate Hallucinations       The following were reviewed at this visit: active  problem list, medication list, allergies, family history, social history, and health maintenance.    Medications:  Current Outpatient Medications on File Prior to Visit   Medication Sig Dispense Refill    gabapentin (NEURONTIN) 600 MG tablet Take 1 tablet by mouth every evening.      ipratropium (ATROVENT) 42 mcg (0.06 %) nasal spray 2 sprays by Nasal route.      aspirin (ECOTRIN) 81 MG EC tablet Take 1 tablet (81 mg total) by mouth once daily. 30 tablet 11    buPROPion (WELLBUTRIN SR) 100 MG TBSR 12 hr tablet Take 1 tablet (100 mg total) by mouth 2 (two) times daily. 180 tablet 0    diltiaZEM (CARDIZEM CD) 180 MG 24 hr capsule Take 1 capsule (180 mg total) by mouth once daily. 30 capsule 11    fluticasone propionate (FLONASE) 50 mcg/actuation nasal spray fluticasone propionate Take 1-2 spray(s) (nasal) 2 times per day PRN for 7 days 2021 spray,suspension 2 times per day nasal 7 days suspended 50 mcg/actuation      metFORMIN (GLUCOPHAGE) 500 MG tablet metformin Take No date recorded No form recorded No frequency recorded No route recorded No set duration recorded No set duration amount recorded active No dosage strength recorded No dosage strength units of measure recorded      saw palmetto 500 MG capsule Take 1 capsule by mouth every morning.      SEMAGLUTIDE SUBQ Inject 5 Units into the skin.      thyroid, pork, (ARMOUR THYROID) 60 mg Tab Take 1 tablet (60 mg total) by mouth before breakfast. 90 tablet 3    traZODone (DESYREL) 50 MG tablet Take 1 tablet (50 mg total) by mouth every evening. 30 tablet 11    vitamin D (VITAMIN D3) 1000 units Tab Take 1 tablet by mouth every morning.      XIIDRA 5 % Dpet       [DISCONTINUED] ORENCIA CLICKJECT 125 mg/mL AtIn SMARTSI Milliliter(s) SUB-Q Once a Week       No current facility-administered medications on file prior to visit.       Medications have been reviewed and reconciled with patient at this visit.  Barriers to medications reviewed with patient.    Adverse  reactions to current medications reviewed with patient..    Over the counter medications reviewed and reconciled with patient.    Exam:  Wt Readings from Last 3 Encounters:   05/07/24 67.4 kg (148 lb 9.4 oz)   02/06/24 67.6 kg (149 lb)   01/26/24 66.7 kg (147 lb)     Temp Readings from Last 3 Encounters:   02/06/24 98.7 °F (37.1 °C) (Tympanic)   01/26/24 97.5 °F (36.4 °C)   12/05/23 97.9 °F (36.6 °C) (Tympanic)     BP Readings from Last 3 Encounters:   05/07/24 130/84   02/06/24 124/78   01/26/24 132/68     Pulse Readings from Last 3 Encounters:   05/07/24 92   02/06/24 76   01/26/24 84     There is no height or weight on file to calculate BMI.      Review of Systems   Constitutional: Negative.  Negative for chills and fever.   HENT: Negative.  Negative for congestion, sinus pain and sore throat.    Eyes:  Negative for blurred vision and double vision.   Respiratory:  Negative for cough, sputum production, shortness of breath and wheezing.    Cardiovascular:  Negative for chest pain, palpitations and leg swelling.   Gastrointestinal:  Positive for constipation. Negative for abdominal pain, diarrhea, heartburn, nausea and vomiting.   Genitourinary: Negative.    Musculoskeletal:  Positive for joint pain.   Skin: Negative.  Negative for rash.   Neurological: Negative.    Endo/Heme/Allergies: Negative.  Negative for polydipsia. Does not bruise/bleed easily.   Psychiatric/Behavioral:  Negative for depression and substance abuse.      Physical Exam  Nursing note reviewed.   Cardiovascular:      Rate and Rhythm: Normal rate and regular rhythm.   Pulmonary:      Effort: Pulmonary effort is normal. No respiratory distress.   Neurological:      Mental Status: She is alert and oriented to person, place, and time.   Psychiatric:         Mood and Affect: Mood normal.         Behavior: Behavior normal.         Thought Content: Thought content normal.         Judgment: Judgment normal.         Laboratory Reviewed ({Yes)  Lab  Results   Component Value Date    WBC 6.69 06/19/2023    HGB 12.7 06/19/2023    HCT 41.3 06/19/2023     06/19/2023    CHOL 250 (H) 08/12/2023    TRIG 113 08/12/2023    HDL 92 (H) 08/12/2023    ALT 23 07/01/2011    AST 17 07/01/2011     06/19/2023    K 5.0 06/19/2023     06/19/2023    CREATININE 1.0 06/19/2023    BUN 12 06/19/2023    CO2 24 06/19/2023    TSH 0.74 01/19/2024    INR 1.0 06/19/2023    HGBA1C 5.0 08/12/2023       Diagnoses and all orders for this visit:    Hyperlipidemia, unspecified hyperlipidemia type  -     Lipid Panel; Future  Stopped lipitor  Recheck risk score    Hypertension, unspecified type  HTn in goal range  Now on CCB instead of BB   Focused on weight    No recurrent SVT  Had ablation    Rheumatoid arthritis, involving unspecified site, unspecified whether rheumatoid factor present  Rheum labs done in Jan  Following                  Care Plan/Goals: Reviewed    Goals    None         Follow up: No follow-ups on file.    After visit summary was printed and given to patient upon discharge today.  Patient goals and care plan are included in After Visit Summary.

## 2024-08-20 RX ORDER — BUPROPION HYDROCHLORIDE 100 MG/1
100 TABLET, EXTENDED RELEASE ORAL 2 TIMES DAILY
Qty: 180 TABLET | Refills: 3 | Status: SHIPPED | OUTPATIENT
Start: 2024-08-20

## 2024-08-20 NOTE — TELEPHONE ENCOUNTER
Care Due:                  Date            Visit Type   Department     Provider  --------------------------------------------------------------------------------                                EP -                              PRIMARY      [facility] INTER[facility]  Last Visit: 07-      CARE ([facility])   MEDICINE       Michelle Tripp  Next Visit: None Scheduled  None         None Found                                                            Last  Test          Frequency    Reason                     Performed    Due Date  --------------------------------------------------------------------------------    TSH.........  12 months..  thyroid,.................  08- 08-    [facility] Embedded Care Due Messages. Reference number: [id number].   8/20/2024 4:45:10 AM CDT

## 2024-08-20 NOTE — TELEPHONE ENCOUNTER
Refill Routing Note   Medication(s) are not appropriate for processing by [facility] for the following reason(s):        New or recently adjusted medication: less than 90 days under signature of current provider    [facility] action(s):  Defer     Requires labs : Yes    Medication Therapy Plan: TSH outdated      Appointments  past 12m or future 3m with PCP    Date Provider   Last Visit   7/11/2024 Michelle Tripp MD   Next Visit   Visit date not found Michelle Tripp MD   ED visits in past 90 days: 0        Note composed:2:17 PM 08/20/2024

## 2024-09-06 DIAGNOSIS — I47.10 SUPRAVENTRICULAR TACHYCARDIA: ICD-10-CM

## 2024-09-06 RX ORDER — DILTIAZEM HYDROCHLORIDE 120 MG/1
120 CAPSULE, COATED, EXTENDED RELEASE ORAL
Qty: 30 CAPSULE | Refills: 11 | Status: SHIPPED | OUTPATIENT
Start: 2024-09-06

## 2024-09-13 RX ORDER — THYROID 60 MG/1
60 TABLET ORAL
Qty: 90 TABLET | Refills: 1 | Status: SHIPPED | OUTPATIENT
Start: 2024-09-13

## 2024-09-13 NOTE — TELEPHONE ENCOUNTER
Refill Decision Note   Sheryl Raul  is requesting a refill authorization.  Brief Assessment and Rationale for Refill:  Approve     Medication Therapy Plan:         Comments:     Note composed:12:12 PM 09/13/2024

## 2024-09-13 NOTE — TELEPHONE ENCOUNTER
No care due was identified.  [facility] Embedded Care Due Messages. Reference number: [id number].   9/13/2024 4:49:04 AM CDT

## 2024-09-23 ENCOUNTER — TELEPHONE (OUTPATIENT)
Dept: INTERNAL MEDICINE | Facility: CLINIC | Age: 74
End: 2024-09-23
Payer: MEDICARE

## 2024-09-23 DIAGNOSIS — M65.30 TRIGGER FINGER, UNSPECIFIED FINGER, UNSPECIFIED LATERALITY: Primary | ICD-10-CM

## 2024-09-23 NOTE — TELEPHONE ENCOUNTER
----- Message from Jovana Chow sent at 9/23/2024 11:55 AM CDT -----  .Type: Orders Request    What orders/ testing are being requested? Referral to orthopedic surgeon    Is there a future appointment scheduled for the patient with PCP? no    When?    Would you prefer a response via Scaffold? Call back    Comments: Patient have a trigger finger and would like a recommendation.

## 2024-09-23 NOTE — TELEPHONE ENCOUNTER
Patient would like a referral to [facility] for trigger finger. Attempted to contact patient to get more information as to which hand and which finger is causing her problems; no answer no voicemail set up.

## 2024-09-24 ENCOUNTER — LAB VISIT (OUTPATIENT)
Dept: LAB | Facility: HOSPITAL | Age: 74
End: 2024-09-24
Attending: FAMILY MEDICINE
Payer: MEDICARE

## 2024-09-24 DIAGNOSIS — E78.5 HYPERLIPIDEMIA, UNSPECIFIED HYPERLIPIDEMIA TYPE: ICD-10-CM

## 2024-09-24 LAB
CHOLEST SERPL-MCNC: 205 MG/DL (ref 120–199)
CHOLEST/HDLC SERPL: 2.9 {RATIO} (ref 2–5)
HDLC SERPL-MCNC: 70 MG/DL (ref 40–75)
HDLC SERPL: 34.1 % (ref 20–50)
LDLC SERPL CALC-MCNC: 116.4 MG/DL (ref 63–159)
NONHDLC SERPL-MCNC: 135 MG/DL
TRIGL SERPL-MCNC: 93 MG/DL (ref 30–150)

## 2024-09-24 PROCEDURE — 36415 COLL VENOUS BLD VENIPUNCTURE: CPT | Performed by: FAMILY MEDICINE

## 2024-09-24 PROCEDURE — 80061 LIPID PANEL: CPT | Performed by: FAMILY MEDICINE

## 2024-10-10 ENCOUNTER — OFFICE VISIT (OUTPATIENT)
Dept: ORTHOPEDICS | Facility: CLINIC | Age: 74
End: 2024-10-10
Payer: MEDICARE

## 2024-10-10 VITALS — BODY MASS INDEX: 24.75 KG/M2 | WEIGHT: 145 LBS | HEIGHT: 64 IN

## 2024-10-10 DIAGNOSIS — M65.30 TRIGGER FINGER, UNSPECIFIED FINGER, UNSPECIFIED LATERALITY: ICD-10-CM

## 2024-10-10 DIAGNOSIS — M65.341 TRIGGER FINGER, RIGHT RING FINGER: Primary | ICD-10-CM

## 2024-10-10 PROCEDURE — 99999 PR PBB SHADOW E&M-EST. PATIENT-LVL IV: CPT | Mod: PBBFAC,,, | Performed by: ORTHOPAEDIC SURGERY

## 2024-10-10 RX ORDER — LANOLIN ALCOHOL/MO/W.PET/CERES
400 CREAM (GRAM) TOPICAL DAILY
COMMUNITY

## 2024-10-10 RX ORDER — TESTOSTERONE GEL, 1% 10 MG/G
GEL TRANSDERMAL DAILY
COMMUNITY

## 2024-10-10 RX ORDER — MULTIVITAMIN
1 TABLET ORAL DAILY
COMMUNITY

## 2024-10-10 RX ORDER — ETANERCEPT 50 MG/ML
SOLUTION SUBCUTANEOUS
COMMUNITY

## 2024-10-10 RX ORDER — LEFLUNOMIDE 10 MG/1
20 TABLET ORAL DAILY
COMMUNITY

## 2024-10-10 NOTE — PROGRESS NOTES
PAO Wang M.D.  Orthopaedic Hand and Wrist Surgery  [address]    Patient ID: Sheryl Carter  YOB: 1950  MRN: [id number]    Provider Note/Medical Decision Makin. Trigger finger, right ring finger  Assessment & Plan:  The patient and I talked at length about the natural history and pathophysiology of right ring finger trigger finger, she understands that this is a chronic problem which may have acute episodic exacerbations.   Symptoms may resolve, worsen and even become permanent.  The patient understands the treatment options including observation, activity modification, therapy, NSAIDs, splints, injections and the surgical options including ulnar slip of the FDS excision of the right ring finger.  We discussed the risks of the diagnosis and the treatment options including pain, infection, bleeding, damage to nerves and vessels, stiffness, scarring, incomplete relief or recurrence of symptoms, poor pain and functional outcomes.  Unique risks of this diagnosis and the treatment include persistent stiffness and PIP contracture.  The patients treatment is further complicated by her rheumatoid arthritis and autoimmune medications.  The patient has elected to proceed with nighttime splinting and we will follow up as needed she may call for an injection she was not interested in injection today.        2. Trigger finger, unspecified finger, unspecified laterality  -     Ambulatory referral/consult to Orthopedics         Chief Complaint: Pain of the Right Hand      Referred By: Alecia Strange    History of Present Illness: Sheryl Carter is a 73 y.o. female right-hand dominant she is here today for evaluation of right ring finger locking it is worse in the mornings it has been going on for about 3-1/2 weeks she has been trying Advil it has gotten better over the past week or so she would get an MRI in January of her hand.      Patient was queried and this is the  "extent of the patients current complaints today.    Past Medical History:     Estimated body mass index is 24.89 kg/m² as calculated from the following:    Height as of this encounter: 5' 4" (1.626 m).    Weight as of this encounter: 65.8 kg (145 lb).  Past Medical History:   Diagnosis Date    Arthritis     Diffuse cystic mastopathy of left breast 06/25/2023    Diffuse cystic mastopathy of right breast 06/25/2023    HTN (hypertension)     Insulin resistance     Mass of multiple sites of left breast 06/25/2023    Mass of upper outer quadrant of right breast 06/25/2023    SVT (supraventricular tachycardia)      Past Surgical History:   Procedure Laterality Date    ABLATION N/A 06/26/2023    Procedure: Ablation;  Surgeon: Jeffry Diaz MD;  Location: [facility] EP LAB;  Service: Cardiology;  Laterality: N/A;  PVC/SVT, RFA, ACOSTA, anes, MB, 3 Prep    ABLATION, SVT, SLOW PATHWAY MODIFICATION FOR AVNRT  06/26/2023    Procedure: Ablation, SVT, Slow Pathway Modification For AVNRT;  Surgeon: Jeffry Diaz MD;  Location: [facility] EP LAB;  Service: Cardiology;;    BREAST SURGERY      biopsy    COLONOSCOPY N/A 1/26/2024    Procedure: COLONOSCOPY;  Surgeon: Meghann Guerrero MD;  Location: [facility] ENDO;  Service: Endoscopy;  Laterality: N/A;    EYE SURGERY      cataract removal    HYSTERECTOMY  2010    JOINT REPLACEMENT  2017    LEFT HIP REPLACEMENT      TOTAL ABDOMINAL HYSTERECTOMY W/ BILATERAL SALPINGOOPHORECTOMY       Family History   Problem Relation Name Age of Onset    Hypertension Father Alverto Phuc     Cancer Father Alverto Phuc     Diabetes Paternal Grandmother Elise Phuc     Heart disease Paternal Grandmother Elise Phuc     Breast cancer Paternal Aunt  73    Arthritis Mother Madai Phuc     Miscarriages / Stillbirths Mother Madai Phuc     Vision loss Maternal Grandfather Madai Feliciano     Arthritis Maternal Grandmother ELISE PHUC     Cancer Paternal Aunt Radha Reeves     Cancer Paternal Uncle Terrance Phuc     COPD Paternal Uncle Terrance " Pedro     Ovarian cancer Neg Hx       Social History     Socioeconomic History    Marital status:    Tobacco Use    Smoking status: Former     Current packs/day: 0.00     Types: Cigarettes     Quit date:      Years since quittin.7    Smokeless tobacco: Never   Substance and Sexual Activity    Alcohol use: Not Currently    Drug use: Never    Sexual activity: Yes     Partners: Male     Birth control/protection: See Surgical Hx     Medication List with Changes/Refills   Current Medications    ASPIRIN (ECOTRIN) 81 MG EC TABLET    Take 1 tablet (81 mg total) by mouth once daily.    BUPROPION (WELLBUTRIN SR) 100 MG TBSR 12 HR TABLET    Take 1 tablet (100 mg total) by mouth 2 (two) times daily.    CALCIUM-VITAMIN D 250 MG-2.5 MCG (100 UNIT) PER TABLET    Take 1 tablet by mouth 2 (two) times daily.    DILTIAZEM (CARDIZEM CD) 120 MG CP24    TAKE 1 CAPSULE(120 MG) BY MOUTH EVERY DAY    DILTIAZEM (CARDIZEM CD) 180 MG 24 HR CAPSULE    Take 1 capsule (180 mg total) by mouth once daily.    ETANERCEPT (ENBREL MINI) 50 MG/ML (1 ML) CRTG    Inject into the skin.    FLUTICASONE PROPIONATE (FLONASE) 50 MCG/ACTUATION NASAL SPRAY    fluticasone propionate Take 1-2 spray(s) (nasal) 2 times per day PRN for 7 days 2021 spray,suspension 2 times per day nasal 7 days suspended 50 mcg/actuation    GABAPENTIN (NEURONTIN) 600 MG TABLET    Take 1 tablet by mouth every evening.    IPRATROPIUM (ATROVENT) 42 MCG (0.06 %) NASAL SPRAY    2 sprays by Nasal route.    LEFLUNOMIDE (ARAVA) 10 MG TAB    Take 20 mg by mouth once daily.    MAGNESIUM OXIDE (MAG-OX) 400 MG (241.3 MG MAGNESIUM) TABLET    Take 400 mg by mouth once daily.    METFORMIN (GLUCOPHAGE) 500 MG TABLET    metformin Take No date recorded No form recorded No frequency recorded No route recorded No set duration recorded No set duration amount recorded active No dosage strength recorded No dosage strength units of measure recorded    MULTIVITAMIN (THERAGRAN) PER TABLET     Take 1 tablet by mouth once daily.    SAW PALMETTO 500 MG CAPSULE    Take 1 capsule by mouth every morning.    SEMAGLUTIDE SUBQ    Inject 5 Units into the skin.    TESTOSTERONE (ANDROGEL) 1 % (50 MG/5 GRAM) GLPK    Apply topically once daily.    THYROID, PORK, (ARMOUR THYROID) 60 MG TAB    Take 1 tablet (60 mg total) by mouth before breakfast.    TRAZODONE (DESYREL) 50 MG TABLET    Take 1 tablet (50 mg total) by mouth every evening.    VITAMIN D (VITAMIN D3) 1000 UNITS TAB    Take 1 tablet by mouth every morning.    XIIDRA 5 % DPET         Review of patient's allergies indicates:   Allergen Reactions    Diphenhydramine hcl Rash     Other reaction(s): Rash, Rash, makes me anxious  benadryl    Meperidine Nausea Only     Other reaction(s): Vomiting  demerol    Codeine phosphate Hallucinations     ROS    Physical Exam:   GENERAL: Well appearing, appropriate for stated age, no acute distress.  CARDIOVASCULAR:  Fingers have good brisk refill and good turgor.   PULMONARY: Respirations are even and non-labored.  NEURO: Awake, alert, and oriented x 3.  PSYCH: Mood & affect are appropriate.  HEENT: Head is normocephalic and atraumatic.  Ortho/SPM Exam  Hand/Wrist Musculoskeletal Exam  Active locking of the right ring finger  Tenderness over the right ring finger A1 pulley  No tenderness of the MP joint dorsally  No extensor tendon subluxation  There is diffuse synovitis of the MP joints consistent with rheumatoid arthritis with ulnar drift    Imaging:    Relevant imaging results reviewed and interpreted by me, discussed with the patient and / or family today.   MR there was reviewed which showed erosive arthritis diffusely as well as diffuse flexor tenosynovitis with flexor tendon and fraying and an ECU split tear      Provider Note/Medical Decision Makin. Trigger finger, right ring finger  Assessment & Plan:  The patient and I talked at length about the natural history and pathophysiology of right ring finger trigger  finger, she understands that this is a chronic problem which may have acute episodic exacerbations.   Symptoms may resolve, worsen and even become permanent.  The patient understands the treatment options including observation, activity modification, therapy, NSAIDs, splints, injections and the surgical options including ulnar slip of the FDS excision of the right ring finger.  We discussed the risks of the diagnosis and the treatment options including pain, infection, bleeding, damage to nerves and vessels, stiffness, scarring, incomplete relief or recurrence of symptoms, poor pain and functional outcomes.  Unique risks of this diagnosis and the treatment include persistent stiffness and PIP contracture.  The patients treatment is further complicated by her rheumatoid arthritis and autoimmune medications.  The patient has elected to proceed with nighttime splinting and we will follow up as needed she may call for an injection she was not interested in injection today.        2. Trigger finger, unspecified finger, unspecified laterality  -     Ambulatory referral/consult to Orthopedics         I discussed worrisome and red flag signs and symptoms with the patient. The patient expressed understanding and agreed to alert me immediately or to go to the emergency room if they experience any of these.   Treatment plan was developed with input from the patient/family, and they expressed understanding and agreement with the plan. All questions were answered today.    There are no Patient Instructions on file for this visit.    PAO Wang M.D.  [facility]  Orthopedic Hand and Wrist Surgeon    [facility]  Dr. Juan Wang   [facility]   [facility]     Disclaimer: This note was prepared using a voice recognition system and is likely to have sound alike errors within the text.

## 2024-10-10 NOTE — ASSESSMENT & PLAN NOTE
The patient and I talked at length about the natural history and pathophysiology of right ring finger trigger finger, she understands that this is a chronic problem which may have acute episodic exacerbations.   Symptoms may resolve, worsen and even become permanent.  The patient understands the treatment options including observation, activity modification, therapy, NSAIDs, splints, injections and the surgical options including ulnar slip of the FDS excision of the right ring finger.  We discussed the risks of the diagnosis and the treatment options including pain, infection, bleeding, damage to nerves and vessels, stiffness, scarring, incomplete relief or recurrence of symptoms, poor pain and functional outcomes.  Unique risks of this diagnosis and the treatment include persistent stiffness and PIP contracture.  The patients treatment is further complicated by her rheumatoid arthritis and autoimmune medications.  The patient has elected to proceed with nighttime splinting and we will follow up as needed she may call for an injection she was not interested in injection today.

## 2024-11-19 ENCOUNTER — OFFICE VISIT (OUTPATIENT)
Dept: CARDIOLOGY | Facility: CLINIC | Age: 74
End: 2024-11-19
Payer: MEDICARE

## 2024-11-19 VITALS
SYSTOLIC BLOOD PRESSURE: 122 MMHG | OXYGEN SATURATION: 98 % | HEART RATE: 77 BPM | DIASTOLIC BLOOD PRESSURE: 83 MMHG | WEIGHT: 144.94 LBS | BODY MASS INDEX: 24.88 KG/M2

## 2024-11-19 DIAGNOSIS — I49.3 PVCS (PREMATURE VENTRICULAR CONTRACTIONS): ICD-10-CM

## 2024-11-19 DIAGNOSIS — M06.9 RHEUMATOID ARTHRITIS, INVOLVING UNSPECIFIED SITE, UNSPECIFIED WHETHER RHEUMATOID FACTOR PRESENT: ICD-10-CM

## 2024-11-19 DIAGNOSIS — Z98.890 STATUS POST RADIOFREQUENCY ABLATION FOR ARRHYTHMIA: ICD-10-CM

## 2024-11-19 DIAGNOSIS — I10 HYPERTENSION, UNSPECIFIED TYPE: ICD-10-CM

## 2024-11-19 DIAGNOSIS — R55 SYNCOPE AND COLLAPSE: Primary | ICD-10-CM

## 2024-11-19 DIAGNOSIS — Z86.79 STATUS POST RADIOFREQUENCY ABLATION FOR ARRHYTHMIA: ICD-10-CM

## 2024-11-19 DIAGNOSIS — I47.10 SVT (SUPRAVENTRICULAR TACHYCARDIA): ICD-10-CM

## 2024-11-19 DIAGNOSIS — I47.10 SUPRAVENTRICULAR TACHYCARDIA: ICD-10-CM

## 2024-11-19 PROCEDURE — 99214 OFFICE O/P EST MOD 30 MIN: CPT | Mod: S$GLB,,, | Performed by: INTERNAL MEDICINE

## 2024-11-19 PROCEDURE — 3079F DIAST BP 80-89 MM HG: CPT | Mod: CPTII,S$GLB,, | Performed by: INTERNAL MEDICINE

## 2024-11-19 PROCEDURE — 1101F PT FALLS ASSESS-DOCD LE1/YR: CPT | Mod: CPTII,S$GLB,, | Performed by: INTERNAL MEDICINE

## 2024-11-19 PROCEDURE — 3074F SYST BP LT 130 MM HG: CPT | Mod: CPTII,S$GLB,, | Performed by: INTERNAL MEDICINE

## 2024-11-19 PROCEDURE — 1126F AMNT PAIN NOTED NONE PRSNT: CPT | Mod: CPTII,S$GLB,, | Performed by: INTERNAL MEDICINE

## 2024-11-19 PROCEDURE — 99999 PR PBB SHADOW E&M-EST. PATIENT-LVL IV: CPT | Mod: PBBFAC,,, | Performed by: INTERNAL MEDICINE

## 2024-11-19 PROCEDURE — 1159F MED LIST DOCD IN RCRD: CPT | Mod: CPTII,S$GLB,, | Performed by: INTERNAL MEDICINE

## 2024-11-19 PROCEDURE — 3008F BODY MASS INDEX DOCD: CPT | Mod: CPTII,S$GLB,, | Performed by: INTERNAL MEDICINE

## 2024-11-19 PROCEDURE — 3288F FALL RISK ASSESSMENT DOCD: CPT | Mod: CPTII,S$GLB,, | Performed by: INTERNAL MEDICINE

## 2024-11-19 RX ORDER — DILTIAZEM HYDROCHLORIDE 300 MG/1
300 CAPSULE, COATED, EXTENDED RELEASE ORAL DAILY
Qty: 30 CAPSULE | Refills: 11 | Status: SHIPPED | OUTPATIENT
Start: 2024-11-19
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
## 2024-12-03 ENCOUNTER — HOSPITAL ENCOUNTER (OUTPATIENT)
Dept: CARDIOLOGY | Facility: HOSPITAL | Age: 74
Discharge: HOME | End: 2024-12-03
Attending: INTERNAL MEDICINE
Payer: MEDICARE

## 2024-12-03 VITALS
WEIGHT: 144 LBS | SYSTOLIC BLOOD PRESSURE: 122 MMHG | HEART RATE: 80 BPM | DIASTOLIC BLOOD PRESSURE: 83 MMHG | HEIGHT: 64 IN | BODY MASS INDEX: 24.59 KG/M2

## 2024-12-03 DIAGNOSIS — R55 SYNCOPE AND COLLAPSE: ICD-10-CM

## 2024-12-03 DIAGNOSIS — I47.10 SUPRAVENTRICULAR TACHYCARDIA: ICD-10-CM

## 2024-12-03 LAB
AORTIC ROOT ANNULUS: 3 CM
ASCENDING AORTA: 2.88 CM
AV INDEX (PROSTH): 0.64
AV MEAN GRADIENT: 4 MMHG
AV PEAK GRADIENT: 7.8 MMHG
AV VALVE AREA BY VELOCITY RATIO: 2 CM²
AV VALVE AREA: 2 CM²
AV VELOCITY RATIO: 0.64
BSA FOR ECHO PROCEDURE: 1.72 M2
CV ECHO LV RWT: 0.36 CM
DOP CALC AO PEAK VEL: 1.4 M/S
DOP CALC AO VTI: 27.5 CM
DOP CALC LVOT AREA: 3.1 CM2
DOP CALC LVOT DIAMETER: 2 CM
DOP CALC LVOT PEAK VEL: 0.9 M/S
DOP CALC LVOT STROKE VOLUME: 55.6 CM3
DOP CALC RVOT PEAK VEL: 0.96 M/S
DOP CALC RVOT VTI: 19.5 CM
DOP CALCLVOT PEAK VEL VTI: 17.7 CM
E WAVE DECELERATION TIME: 156.24 MSEC
E/A RATIO: 0.88
E/E' RATIO: 7.79 M/S
ECHO LV POSTERIOR WALL: 0.8 CM (ref 0.6–1.1)
EJECTION FRACTION: 55 %
FRACTIONAL SHORTENING: 28.9 % (ref 28–44)
INTERVENTRICULAR SEPTUM: 0.9 CM (ref 0.6–1.1)
IVRT: 99.9 MSEC
LA MAJOR: 3.5 CM
LA MINOR: 3.97 CM
LA WIDTH: 3.4 CM
LEFT ATRIUM AREA SYSTOLIC (APICAL 2 CHAMBER): 15.6 CM2
LEFT ATRIUM AREA SYSTOLIC (APICAL 4 CHAMBER): 14.4 CM2
LEFT ATRIUM SIZE: 3.22 CM
LEFT ATRIUM VOLUME INDEX MOD: 24.4 ML/M2
LEFT ATRIUM VOLUME INDEX: 20.4 ML/M2
LEFT ATRIUM VOLUME MOD: 41.55 ML
LEFT ATRIUM VOLUME: 34.62 CM3
LEFT INTERNAL DIMENSION IN SYSTOLE: 3.2 CM (ref 2.1–4)
LEFT VENTRICLE DIASTOLIC VOLUME INDEX: 54.75 ML/M2
LEFT VENTRICLE DIASTOLIC VOLUME: 93.08 ML
LEFT VENTRICLE END SYSTOLIC VOLUME APICAL 2 CHAMBER: 41.61 ML
LEFT VENTRICLE END SYSTOLIC VOLUME APICAL 4 CHAMBER: 40.01 ML
LEFT VENTRICLE MASS INDEX: 72.4 G/M2
LEFT VENTRICLE SYSTOLIC VOLUME INDEX: 23.4 ML/M2
LEFT VENTRICLE SYSTOLIC VOLUME: 39.78 ML
LEFT VENTRICULAR INTERNAL DIMENSION IN DIASTOLE: 4.5 CM (ref 3.5–6)
LEFT VENTRICULAR MASS: 123.1 G
LV LATERAL E/E' RATIO: 6.73 M/S
LV SEPTAL E/E' RATIO: 9.25 M/S
LVED V (TEICH): 93.08 ML
LVES V (TEICH): 39.78 ML
LVOT MG: 1.6 MMHG
LVOT MV: 0.6 CM/S
MV PEAK A VEL: 0.84 M/S
MV PEAK E VEL: 0.74 M/S
MV STENOSIS PRESSURE HALF TIME: 45.31 MS
MV VALVE AREA P 1/2 METHOD: 4.86 CM2
PISA TR MAX VEL: 2.45 M/S
PV MEAN GRADIENT: 2 MMHG
PV PEAK GRADIENT: 4 MMHG
PV PEAK VELOCITY: 0.97 M/S
RA MAJOR: 3.88 CM
RA PRESSURE ESTIMATED: 3 MMHG
RA WIDTH: 3.67 CM
RIGHT VENTRICULAR END-DIASTOLIC DIMENSION: 3.37 CM
RV TB RVSP: 5 MMHG
SINUS: 3.18 CM
STJ: 2.92 CM
TDI LATERAL: 0.11 M/S
TDI SEPTAL: 0.08 M/S
TDI: 0.1 M/S
TR MAX PG: 24 MMHG
TRICUSPID ANNULAR PLANE SYSTOLIC EXCURSION: 1.59 CM
TV REST PULMONARY ARTERY PRESSURE: 27 MMHG
Z-SCORE OF LEFT VENTRICULAR DIMENSION IN END DIASTOLE: -0.49
Z-SCORE OF LEFT VENTRICULAR DIMENSION IN END SYSTOLE: 0.7

## 2024-12-03 PROCEDURE — 93306 TTE W/DOPPLER COMPLETE: CPT

## 2024-12-03 PROCEDURE — 93306 TTE W/DOPPLER COMPLETE: CPT | Mod: 26,,, | Performed by: INTERNAL MEDICINE

## 2025-03-27 ENCOUNTER — TELEPHONE (OUTPATIENT)
Dept: CARDIOLOGY | Facility: CLINIC | Age: 75
End: 2025-03-27
Payer: MEDICARE

## 2025-03-27 NOTE — TELEPHONE ENCOUNTER
Paper work printed and given to  will fax over when signed.  ----- Message from Arielle sent at 3/27/2025  9:16 AM CDT -----  Contact: Brigette/[facility]  [facility] Brig[facility] is calling in regards to get a cardiac clearance for a hip replacement schedule for tomorrow at that clinic.Please send it through fax number: [phone] please call her back at [phone]!

## 2025-04-10 ENCOUNTER — PATIENT MESSAGE (OUTPATIENT)
Dept: SURGERY | Facility: CLINIC | Age: 75
End: 2025-04-10
Payer: MEDICARE

## 2025-04-22 ENCOUNTER — PATIENT MESSAGE (OUTPATIENT)
Dept: INTERNAL MEDICINE | Facility: CLINIC | Age: 75
End: 2025-04-22
Payer: MEDICARE

## 2025-04-22 DIAGNOSIS — E03.9 HYPOTHYROIDISM, UNSPECIFIED TYPE: Primary | ICD-10-CM

## 2025-04-22 NOTE — TELEPHONE ENCOUNTER
See pended order. Please advise.//ddw    -Diagnosis unknown. T4, Free and T3 ordered with previous labs on 1/19/2024.

## 2025-04-28 ENCOUNTER — LAB VISIT (OUTPATIENT)
Dept: LAB | Facility: HOSPITAL | Age: 75
End: 2025-04-28
Attending: FAMILY MEDICINE
Payer: MEDICARE

## 2025-04-28 DIAGNOSIS — E03.9 HYPOTHYROIDISM, UNSPECIFIED TYPE: ICD-10-CM

## 2025-04-28 LAB — TSH SERPL-ACNC: 0.88 UIU/ML (ref 0.4–4)

## 2025-04-28 PROCEDURE — 36415 COLL VENOUS BLD VENIPUNCTURE: CPT

## 2025-04-28 PROCEDURE — 84443 ASSAY THYROID STIM HORMONE: CPT

## 2025-04-29 ENCOUNTER — RESULTS FOLLOW-UP (OUTPATIENT)
Dept: INTERNAL MEDICINE | Facility: CLINIC | Age: 75
End: 2025-04-29

## 2025-04-29 RX ORDER — THYROID 60 MG/1
60 TABLET ORAL
Qty: 90 TABLET | Refills: 0 | Status: SHIPPED | OUTPATIENT
Start: 2025-04-29

## 2025-05-14 ENCOUNTER — OFFICE VISIT (OUTPATIENT)
Dept: CARDIOLOGY | Facility: CLINIC | Age: 75
End: 2025-05-14
Payer: MEDICARE

## 2025-05-14 VITALS
HEART RATE: 79 BPM | WEIGHT: 146.94 LBS | OXYGEN SATURATION: 98 % | DIASTOLIC BLOOD PRESSURE: 82 MMHG | BODY MASS INDEX: 25.22 KG/M2 | SYSTOLIC BLOOD PRESSURE: 138 MMHG

## 2025-05-14 DIAGNOSIS — F41.9 ANXIETY: ICD-10-CM

## 2025-05-14 DIAGNOSIS — Z86.79 STATUS POST RADIOFREQUENCY ABLATION FOR ARRHYTHMIA: ICD-10-CM

## 2025-05-14 DIAGNOSIS — I10 HYPERTENSION, UNSPECIFIED TYPE: ICD-10-CM

## 2025-05-14 DIAGNOSIS — I47.10 SUPRAVENTRICULAR TACHYCARDIA: Primary | ICD-10-CM

## 2025-05-14 DIAGNOSIS — I49.3 PVCS (PREMATURE VENTRICULAR CONTRACTIONS): ICD-10-CM

## 2025-05-14 DIAGNOSIS — I47.10 SVT (SUPRAVENTRICULAR TACHYCARDIA): ICD-10-CM

## 2025-05-14 DIAGNOSIS — Z98.890 STATUS POST RADIOFREQUENCY ABLATION FOR ARRHYTHMIA: ICD-10-CM

## 2025-05-14 DIAGNOSIS — M06.9 RHEUMATOID ARTHRITIS, INVOLVING UNSPECIFIED SITE, UNSPECIFIED WHETHER RHEUMATOID FACTOR PRESENT: ICD-10-CM

## 2025-05-14 PROCEDURE — 1159F MED LIST DOCD IN RCRD: CPT | Mod: CPTII,S$GLB,, | Performed by: INTERNAL MEDICINE

## 2025-05-14 PROCEDURE — 3008F BODY MASS INDEX DOCD: CPT | Mod: CPTII,S$GLB,, | Performed by: INTERNAL MEDICINE

## 2025-05-14 PROCEDURE — 99214 OFFICE O/P EST MOD 30 MIN: CPT | Mod: S$GLB,,, | Performed by: INTERNAL MEDICINE

## 2025-05-14 PROCEDURE — 3079F DIAST BP 80-89 MM HG: CPT | Mod: CPTII,S$GLB,, | Performed by: INTERNAL MEDICINE

## 2025-05-14 PROCEDURE — 3288F FALL RISK ASSESSMENT DOCD: CPT | Mod: CPTII,S$GLB,, | Performed by: INTERNAL MEDICINE

## 2025-05-14 PROCEDURE — 1126F AMNT PAIN NOTED NONE PRSNT: CPT | Mod: CPTII,S$GLB,, | Performed by: INTERNAL MEDICINE

## 2025-05-14 PROCEDURE — 1101F PT FALLS ASSESS-DOCD LE1/YR: CPT | Mod: CPTII,S$GLB,, | Performed by: INTERNAL MEDICINE

## 2025-05-14 PROCEDURE — 3044F HG A1C LEVEL LT 7.0%: CPT | Mod: CPTII,S$GLB,, | Performed by: INTERNAL MEDICINE

## 2025-05-14 PROCEDURE — 3075F SYST BP GE 130 - 139MM HG: CPT | Mod: CPTII,S$GLB,, | Performed by: INTERNAL MEDICINE

## 2025-05-14 PROCEDURE — 99999 PR PBB SHADOW E&M-EST. PATIENT-LVL III: CPT | Mod: PBBFAC,,, | Performed by: INTERNAL MEDICINE

## 2025-05-14 RX ORDER — DILTIAZEM HYDROCHLORIDE 180 MG/1
180 CAPSULE, COATED, EXTENDED RELEASE ORAL DAILY
Qty: 90 CAPSULE | Refills: 3 | Status: SHIPPED | OUTPATIENT
Start: 2025-05-14

## 2025-05-14 NOTE — PROGRESS NOTES
Subjective:   Patient ID:  05/14/2025      Sheryl Carter is a 74 y.o. female who presents for evaluation of Follow-up      History of Present Illness    CHIEF COMPLAINT:  Patient presents today for follow up after total hip replacement    SURGICAL HISTORY:  She underwent a total hip replacement on March 28th by Dr. Kody Watt and reports doing well post-op.    CARDIAC HISTORY:  30-day cardiac monitor from late last year showed no evidence of AF but detected episodes of ectopic beats, including one instance of non-sustained ventricular tachycardia with three consecutive PVCs. Over 300 episodes of short SVT were recorded, with the longest episode lasting 27 beats at a rate of 180 BPM. She has a history of SVT ablation. She consumes 2 cups of coffee daily and some soda.    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 She reports increased fluid intake for hydration.    RHEUMATOID ARTHRITIS:  She currently takes Rinvoq and Reumaflex for rheumatoid arthritis management. She uses Arava for flare-ups, noting it may slightly increase BP. Previous biologic medications were ineffective in managing her condition.         HPI    Past Medical History:   Diagnosis Date    Arthritis     Diffuse cystic mastopathy of left breast 06/25/2023    Diffuse cystic mastopathy of right breast 06/25/2023    HTN (hypertension)     Insulin resistance     Mass of multiple sites of left breast 06/25/2023    Mass of upper outer quadrant of right breast 06/25/2023    SVT (supraventricular tachycardia)        Past Surgical History:   Procedure Laterality Date    ABLATION N/A 06/26/2023    Procedure: Ablation;  Surgeon: Jeffry Diaz MD;  Location: [facility] EP LAB;  Service: Cardiology;  Laterality: N/A;  PVC/SVT, RFA, ACOSTA, anes, MB, 3 Prep    ABLATION, SVT, SLOW PATHWAY MODIFICATION FOR AVNRT  06/26/2023    Procedure: Ablation, SVT, Slow Pathway Modification For  AVNRT;  Surgeon: Jeffry Diaz MD;  Location: [facility] EP LAB;  Service: Cardiology;;    BREAST SURGERY      biopsy    COLONOSCOPY N/A 01/26/2024    Procedure: COLONOSCOPY;  Surgeon: Meghann Guerrero MD;  Location: [facility] ENDO;  Service: Endoscopy;  Laterality: N/A;    EYE SURGERY      cataract removal    HIP REPLACEMENT ARTHROPLASTY  03/2025    HYSTERECTOMY  2010    JOINT REPLACEMENT  2017    LEFT HIP REPLACEMENT      TOTAL ABDOMINAL HYSTERECTOMY W/ BILATERAL SALPINGOOPHORECTOMY         Social History[1]    Family History   Problem Relation Name Age of Onset    Hypertension Father Alverto Phuc     Cancer Father Alverto Phuc     Diabetes Paternal Grandmother Elise Phuc     Heart disease Paternal Grandmother Elise Phuc     Breast cancer Paternal Aunt  73    Arthritis Mother Madai Phuc     Miscarriages / Stillbirths Mother Madai Phuc     Vision loss Maternal Grandfather Madai Feliciano     Arthritis Maternal Grandmother ELISE PHUC     Cancer Paternal Aunt Radha Reeves     Cancer Paternal Uncle Terrance Phuc     COPD Paternal Uncle Terrance Phuc     Ovarian cancer Neg Hx           Review of Systems   Cardiovascular:  Negative for chest pain, dyspnea on exertion, palpitations and syncope.   Neurological:  Negative for focal weakness.       Current Outpatient Medications on File Prior to Visit   Medication Sig    buPROPion (WELLBUTRIN SR) 100 MG TBSR 12 hr tablet Take 1 tablet (100 mg total) by mouth 2 (two) times daily.    calcium-vitamin D 250 mg-2.5 mcg (100 unit) per tablet Take 1 tablet by mouth 2 (two) times daily.    fluticasone propionate (FLONASE) 50 mcg/actuation nasal spray fluticasone propionate Take 1-2 spray(s) (nasal) 2 times per day PRN for 7 days [id number] spray,suspension 2 times per day nasal 7 days suspended 50 mcg/actuation    gabapentin (NEURONTIN) 600 MG tablet Take 1 tablet by mouth every evening.    leflunomide (ARAVA) 10 MG Tab Take 20 mg by mouth once daily.    magnesium oxide (MAG-OX) 400 mg (241.3 mg magnesium)  tablet Take 400 mg by mouth once daily.    metFORMIN (GLUCOPHAGE) 500 MG tablet metformin Take No date recorded No form recorded No frequency recorded No route recorded No set duration recorded No set duration amount recorded active No dosage strength recorded No dosage strength units of measure recorded    multivitamin (THERAGRAN) per tablet Take 1 tablet by mouth once daily.    saw palmetto 500 MG capsule Take 1 capsule by mouth every morning.    testosterone (ANDROGEL) 1 % (50 mg/5 gram) GlPk Apply topically once daily.    thyroid, pork, (ARMOUR THYROID) 60 mg Tab Take 1 tablet (60 mg total) by mouth before breakfast.    vitamin D (VITAMIN D3) 1000 units Tab Take 1 tablet by mouth every morning.    XIIDRA 5 % Dpet     [DISCONTINUED] diltiaZEM (CARDIZEM CD) 300 MG 24 hr capsule Take 1 capsule (300 mg total) by mouth once daily.    aspirin (ECOTRIN) 81 MG EC tablet Take 1 tablet (81 mg total) by mouth once daily.    traZODone (DESYREL) 50 MG tablet Take 1 tablet (50 mg total) by mouth every evening.    [DISCONTINUED] etanercept (ENBREL MINI) 50 mg/mL (1 mL) Crtg Inject into the skin.    [DISCONTINUED] SEMAGLUTIDE SUBQ Inject 5 Units into the skin.     No current facility-administered medications on file prior to visit.       Objective:   Objective:  Wt Readings from Last 3 Encounters:   05/14/25 66.7 kg (146 lb 15 oz)   12/03/24 65.3 kg (144 lb)   11/19/24 65.8 kg (144 lb 15.2 oz)     Temp Readings from Last 3 Encounters:   07/11/24 97.2 °F (36.2 °C)   02/06/24 98.7 °F (37.1 °C) (Tympanic)   01/26/24 97.5 °F (36.4 °C)     BP Readings from Last 3 Encounters:   05/14/25 138/82   12/03/24 122/83   11/19/24 122/83     Pulse Readings from Last 3 Encounters:   05/14/25 79   12/03/24 80   11/19/24 77       Physical Exam  Vitals reviewed.   Constitutional:       Appearance: She is well-developed.   Neck:      Vascular: No carotid bruit.   Cardiovascular:      Rate and Rhythm: Normal rate and regular rhythm.      Pulses:  Intact distal pulses.      Heart sounds: Normal heart sounds. No murmur heard.  Pulmonary:      Breath sounds: Normal breath sounds.   Neurological:      Mental Status: She is oriented to person, place, and time.           Lab Results   Component Value Date    CHOL 205 (H) 09/24/2024    CHOL 250 (H) 08/12/2023    CHOL 203 (H) 08/02/2010     Lab Results   Component Value Date    HDL 70 09/24/2024    HDL 92 (H) 08/12/2023    HDL 65 08/02/2010     Lab Results   Component Value Date    LDLCALC 116.4 09/24/2024    LDLCALC 135.4 08/12/2023    LDLCALC 119.0 08/02/2010     Lab Results   Component Value Date    TRIG 93 09/24/2024    TRIG 113 08/12/2023    TRIG 95 08/02/2010     Lab Results   Component Value Date    CHOLHDL 34.1 09/24/2024    CHOLHDL 36.8 08/12/2023    CHOLHDL 32.0 08/02/2010       Chemistry        Component Value Date/Time     03/29/2025 0312     06/19/2023 1126    K 4.1 03/29/2025 0312    K 5.0 06/19/2023 1126     (H) 03/29/2025 0312     06/19/2023 1126    CO2 24 03/29/2025 0312    CO2 24 06/19/2023 1126    BUN 15 03/29/2025 0312    BUN 12 06/19/2023 1126    CREATININE 0.66 03/29/2025 0312    CREATININE 1.0 06/19/2023 1126    GLU 83 06/19/2023 1126        Component Value Date/Time    CALCIUM 8.1 (L) 03/29/2025 0312    CALCIUM 10.2 06/19/2023 1126    ALKPHOS 78 07/01/2011 1721    AST 17 07/01/2011 1721    ALT 23 07/01/2011 1721    BILITOT 0.3 07/01/2011 1721    ESTGFRAFRICA 93 03/29/2025 0312    ESTGFRAFRICA >60 07/01/2011 1721    EGFRNONAA 57 (A) 07/01/2011 1721          Lab Results   Component Value Date    TSH 0.879 04/28/2025     Lab Results   Component Value Date    INR 1.1 02/26/2025    INR 1.0 06/19/2023     Lab Results   Component Value Date    WBC 6.69 06/19/2023    HGB Negative 02/26/2025    HCT 41.3 06/19/2023    MCV 87 06/19/2023     06/19/2023     BNP  @LABRCNTIP(BNP,BNPTRIAGEBLO)@  CrCl cannot be calculated (Patient's most recent lab result is older than the  maximum 7 days allowed.).     Imaging:  ======    No results found for this or any previous visit.    No results found for this or any previous visit.    Results for orders placed in visit on 09/20/10    X-Ray Chest PA And Lateral    Narrative  DATE OF EXAM: Sep 20 2010    BOC   0190  -  CHEST 2 VIEWS FRONTAL   LAT:  [id number]    CLINICAL HISTORY:   V74.1 0 PULMONARY TB SCREENING    PROCEDURE COMMENT:    ICD 9 CODE(S):   ()    CPT 4 CODE(S)/MODIFIER(S):   ()    RESULTS: COMPARISON MADE TO 2/25/2010 EXAM.    ALLOWING FOR POSITION AND TECHNIQUE, THERE HAS BEEN NO SIGNIFICANT  INTERVAL CHANGE.  HEART SIZE IS WITHIN NORMAL LIMITS AND THE AORTA IS  TORTUOUS.  THERE IS NO FOCAL CONSOLIDATION OR EFFUSION.  THERE IS STABLE  SMOOTH ELEVATION OF THE RIGHT HEMIDIAPHRAGM.  MILD DEGENERATIVE CHANGE  NOTED IN THE SPINE.    IMPRESSION: STABLE EXAM WITHOUT ACUTE INFILTRATE.      : vee  Transcribe Date/Time: Sep 21 2010  9:48A  Dictated by : YUSRA HOOKS III, DR  Report reviewed by:   Read On: Sep 21 2010  8:34A  YUSRA HOOKS III, M.D.  [id number]  Images were reviewed, findings were verified and document was  electronically  SIGNED BY: YUSRA HOOKS III, DR On: Sep 21 2010 12:25P      No results found for this or any previous visit.      No valid procedures specified.        No results found for this or any previous visit.      Results for orders placed during the hospital encounter of 12/03/24    Echo    Interpretation Summary    Left Ventricle: The left ventricle is normal in size. There is normal systolic function. Ejection fraction is approximately 55%. Grade I diastolic dysfunction.    Right Ventricle: Normal right ventricular cavity size. Systolic function is normal.    Tricuspid Valve: There is mild regurgitation.    IVC/SVC: Normal venous pressure at 3 mmHg.      No results found for this or any previous visit.      Lab Results   Component Value Date    HGBA1C 5.4 02/26/2025         The 10-year ASCVD risk  score (Bri MONTE, et al., 2019) is: 17.5%    Values used to calculate the score:      Age: 74 years      Sex: Female      Is Non- : No      Diabetic: No      Tobacco smoker: No      Systolic Blood Pressure: 138 mmHg      Is BP treated: Yes      HDL Cholesterol: 70 mg/dL      Total Cholesterol: 205 mg/dL    Diagnostic Results:  ECG: Reviewed    Interpretation Summary  Show Result Comparison  The patient was monitored for a total of 27d 8h, underlying rhythm is Sinus.  The minimum heart rate was 62 bpm; the maximum 149 bpm; the average 91 bpm.  0 % of Atrial fibrillation/Atrial flutter with longest episode of 0 ms.  The total burden of AV Block present was 0 % [Complete Heart Block: 0 %; Advanced (High Grade):  0 %; 2nd Degree, Mobitz II: 0 %; 2nd Degree, Mobitz I: 0 %].  There were 0 pauses, the longest pause was 0 ms at --.  Total count of Ventricular Tachycardia (VT): 1 episode(s). Longest VT: 3 beats on Day 24 / 02:04:11  am. Fastest VT: 101 bpm on Day 24 / 02:04:11 am. Total Count of Ventricular Episodes <100bpm: 0  episode(s). Longest Ventricular Event <100bpm: 0 s on --  379 supraventricular episodes were found. Longest SVT Episode 27 beats, Fastest  bpm  There were a total of 8882 PVCs with 2 morphologies and 20 couplets. Overall PVC [place] at 0.58 %  There were a total of 0 Other Beats. There were 0 total number of paced beats.  There were a total of 00824 PSVCs with 379 couplets. Overall PSVC [place] at  1.45 %  There is a total of 0 patient events.    Assessment and Plan:   Supraventricular tachycardia  -     diltiaZEM (CARDIZEM CD) 180 MG 24 hr capsule; Take 1 capsule (180 mg total) by mouth once daily.  Dispense: 90 capsule; Refill: 3    Status post radiofrequency ablation for arrhythmia    PVCs (premature ventricular contractions)    SVT (supraventricular tachycardia)    Rheumatoid arthritis, involving unspecified site, unspecified whether rheumatoid factor  present    Hypertension, unspecified type    Anxiety        Assessment & Plan    M06.9 Rheumatoid arthritis, involving unspecified site, unspecified whether rheumatoid factor present  I47.10 Supraventricular tachycardia  Z98.890, Z86.79 Status post radiofrequency ablation for arrhythmia  I49.3 PVCs (premature ventricular contractions)  I10 Hypertension, unspecified type  F41.9 Anxiety    Reviewed results of 30-day heart monitor:  No evidence of a-fib.  Short episodes of ectopic beats, including one instance of non-sustained ventricular tachycardia (3 PVCs).  Multiple short SVT episodes, with longest run being 27 beats at 180 bpm.  Isolated PVCs occurring about 1% of the time.  No need for repeat SVT ablation based on monitor results.  Current dose of 180 mg cardizem is effective.    M06.9 RHEUMATOID ARTHRITIS, INVOLVING UNSPECIFIED SITE, UNSPECIFIED WHETHER RHEUMATOID FACTOR PRESENT:  - Discontinued Enbrel.  - Continued colchicine, prescribed for 90 days with daily dosing.    I47.10 SUPRAVENTRICULAR TACHYCARDIA:  - Changed Tikosyn from 300 mg to 180 mg daily, [facility] [address]    I49.3 PVCS (PREMATURE VENTRICULAR CONTRACTIONS):  - Explained that caffeine consumption, lack of sleep, exhaustion, stress, anxiety can contribute to palpitations and ectopic beats.    F41.9 ANXIETY:  - Explained that anxiety can contribute to palpitations and ectopic beats.    ** DIAGNOSES NOT IN VISIT DX **  LIFESTYLE CHANGES:  - Patient to continue efforts to stay hydrated.  - Discontinued semaglutide.  - Follow up in 6 months.           This note was generated with the assistance of ambient listening technology. Verbal consent was obtained by the patient and accompanying visitor(s) for the recording of patient appointment to facilitate this note. I attest to having reviewed and edited the generated note for accuracy, though some syntax or spelling errors may persist. Please contact the author of this note for any  clarification.      Reviewed all tests and above medical conditions with patient in detail and formulated treatment plan.  Maintaining healthy weight and weight loss goals were discussed in clinic.    Follow up in  6 months         [1]   Social History  Tobacco Use    Smoking status: Former     Current packs/day: 0.00     Types: Cigarettes     Quit date:      Years since quittin.3    Smokeless tobacco: Never   Substance Use Topics    Alcohol use: Not Currently    Drug use: Never

## 2025-07-27 RX ORDER — THYROID 60 MG/1
TABLET ORAL
Qty: 90 TABLET | Refills: 0 | Status: SHIPPED | OUTPATIENT
Start: 2025-07-27

## 2025-07-27 NOTE — TELEPHONE ENCOUNTER
Care Due:                  Date            Visit Type   Department     Provider  --------------------------------------------------------------------------------                                EP -                              PRIMARY      [facility]  Last Visit: 07-      CARE ([facility])   MEDICINE       Michelle Tripp  Next Visit: None Scheduled  None         None Found                                                            Last  Test          Frequency    Reason                     Performed    Due Date  --------------------------------------------------------------------------------    Office Visit  15 months..  buPROPion, thyroid,,       07-   10-                             traZODone................    [employer] Embedded Care Due Messages. Reference number: [id number].   7/27/2025 3:48:55 AM CDT

## 2025-07-28 NOTE — TELEPHONE ENCOUNTER
Provider Staff:  Action required for this patient     Please see care gap opportunities below in Care Due Message.    Thanks!  [facility]     Appointments      Date Provider   Last Visit   7/11/2024 Michelle Tripp MD   Next Visit   Visit date not found Michelle Tripp MD      Refill Decision Note   Sheryl Carter  is requesting a refill authorization.  Brief Assessment and Rationale for Refill:  Approve     Medication Therapy Plan:         Comments:     Note composed:10:14 PM 07/27/2025

## (undated) DEVICE — PACK EP DRAPE OMC

## (undated) DEVICE — CATH TACTICATH ABLAT BIDIR F-J

## (undated) DEVICE — KIT MICROINTRO 4F .018X40X7CM

## (undated) DEVICE — PAD DEFIB CADENCE ADULT R2

## (undated) DEVICE — SHEATH BRITE TIP 9F 35CM

## (undated) DEVICE — INTRO AGILIS MED CRL 8.5F 71CM

## (undated) DEVICE — R CATH BIDIRECTIONL DF CRV 7FR

## (undated) DEVICE — KIT PROBE COVER WITH GEL

## (undated) DEVICE — GUIDEWIRE SUPRA CORE 035 190CM

## (undated) DEVICE — INTRODUCER HEMOSTASIS 7.5F

## (undated) DEVICE — DEVICE PERCLOSE SUT CLSR 6FR

## (undated) DEVICE — SHEATH HEMOSTASIS 8.5FR

## (undated) DEVICE — R CATH RESPONS QPLR JSN 6F 120

## (undated) DEVICE — PAD GROUND UNIV STYLE CORD 9IN

## (undated) DEVICE — SET TUBING COOL POINT IRR

## (undated) DEVICE — R CATH SUPRM QPLR CRD-2 6F 120

## (undated) DEVICE — CATH MAP BI-D HD SENSOR ENABLE

## (undated) DEVICE — KIT ENSITE ELECTRODE SURFACE